# Patient Record
Sex: MALE | Race: WHITE | Employment: UNEMPLOYED | URBAN - METROPOLITAN AREA
[De-identification: names, ages, dates, MRNs, and addresses within clinical notes are randomized per-mention and may not be internally consistent; named-entity substitution may affect disease eponyms.]

---

## 2021-06-05 ENCOUNTER — APPOINTMENT (OUTPATIENT)
Dept: GENERAL RADIOLOGY | Age: 51
End: 2021-06-05
Payer: MEDICARE

## 2021-06-05 ENCOUNTER — HOSPITAL ENCOUNTER (OUTPATIENT)
Age: 51
Setting detail: OBSERVATION
Discharge: HOME | End: 2021-06-08
Attending: EMERGENCY MEDICINE | Admitting: ORTHOPAEDIC SURGERY
Payer: MEDICARE

## 2021-06-05 DIAGNOSIS — M89.8X6 PAIN IN LEFT TIBIA: ICD-10-CM

## 2021-06-05 DIAGNOSIS — S82.142A TIBIAL PLATEAU FRACTURE, LEFT, CLOSED, INITIAL ENCOUNTER: Primary | ICD-10-CM

## 2021-06-05 PROCEDURE — 96374 THER/PROPH/DIAG INJ IV PUSH: CPT

## 2021-06-05 PROCEDURE — 73560 X-RAY EXAM OF KNEE 1 OR 2: CPT

## 2021-06-05 PROCEDURE — 71045 X-RAY EXAM CHEST 1 VIEW: CPT

## 2021-06-05 PROCEDURE — 96375 TX/PRO/DX INJ NEW DRUG ADDON: CPT

## 2021-06-05 PROCEDURE — 73590 X-RAY EXAM OF LOWER LEG: CPT

## 2021-06-05 PROCEDURE — 6360000002 HC RX W HCPCS: Performed by: EMERGENCY MEDICINE

## 2021-06-05 RX ORDER — FENTANYL CITRATE 50 UG/ML
50 INJECTION, SOLUTION INTRAMUSCULAR; INTRAVENOUS ONCE
Status: COMPLETED | OUTPATIENT
Start: 2021-06-05 | End: 2021-06-05

## 2021-06-05 RX ADMIN — FENTANYL CITRATE 50 MCG: 0.05 INJECTION, SOLUTION INTRAMUSCULAR; INTRAVENOUS at 23:48

## 2021-06-05 ASSESSMENT — PAIN DESCRIPTION - DESCRIPTORS: DESCRIPTORS: TIGHTNESS;THROBBING;PRESSURE

## 2021-06-05 ASSESSMENT — PAIN DESCRIPTION - ONSET: ONSET: ON-GOING

## 2021-06-05 ASSESSMENT — PAIN DESCRIPTION - PAIN TYPE: TYPE: ACUTE PAIN

## 2021-06-05 ASSESSMENT — PAIN DESCRIPTION - LOCATION: LOCATION: KNEE

## 2021-06-05 ASSESSMENT — PAIN DESCRIPTION - ORIENTATION: ORIENTATION: LEFT

## 2021-06-05 ASSESSMENT — PAIN SCALES - GENERAL
PAINLEVEL_OUTOF10: 10
PAINLEVEL_OUTOF10: 10

## 2021-06-05 ASSESSMENT — PAIN DESCRIPTION - FREQUENCY: FREQUENCY: CONTINUOUS

## 2021-06-06 ENCOUNTER — APPOINTMENT (OUTPATIENT)
Dept: CT IMAGING | Age: 51
End: 2021-06-06
Payer: MEDICARE

## 2021-06-06 ENCOUNTER — ANESTHESIA EVENT (OUTPATIENT)
Dept: OPERATING ROOM | Age: 51
End: 2021-06-06
Payer: MEDICARE

## 2021-06-06 ENCOUNTER — ANESTHESIA (OUTPATIENT)
Dept: OPERATING ROOM | Age: 51
End: 2021-06-06
Payer: MEDICARE

## 2021-06-06 ENCOUNTER — APPOINTMENT (OUTPATIENT)
Dept: GENERAL RADIOLOGY | Age: 51
End: 2021-06-06
Payer: MEDICARE

## 2021-06-06 VITALS — DIASTOLIC BLOOD PRESSURE: 91 MMHG | TEMPERATURE: 96.8 F | OXYGEN SATURATION: 96 % | SYSTOLIC BLOOD PRESSURE: 127 MMHG

## 2021-06-06 PROBLEM — S82.142A TIBIAL PLATEAU FRACTURE, LEFT, CLOSED, INITIAL ENCOUNTER: Status: ACTIVE | Noted: 2021-06-06

## 2021-06-06 LAB
ABO/RH: NORMAL
ANION GAP SERPL CALCULATED.3IONS-SCNC: 10 MMOL/L (ref 7–16)
ANTIBODY SCREEN: NORMAL
BUN BLDV-MCNC: 9 MG/DL (ref 6–20)
CALCIUM SERPL-MCNC: 8 MG/DL (ref 8.6–10.2)
CHLORIDE BLD-SCNC: 98 MMOL/L (ref 98–107)
CO2: 24 MMOL/L (ref 22–29)
CREAT SERPL-MCNC: 0.8 MG/DL (ref 0.7–1.2)
GFR AFRICAN AMERICAN: >60
GFR NON-AFRICAN AMERICAN: >60 ML/MIN/1.73
GLUCOSE BLD-MCNC: 130 MG/DL (ref 74–99)
HCT VFR BLD CALC: 39.7 % (ref 37–54)
HEMOGLOBIN: 13.8 G/DL (ref 12.5–16.5)
INR BLD: 1.1
MCH RBC QN AUTO: 30.9 PG (ref 26–35)
MCHC RBC AUTO-ENTMCNC: 34.8 % (ref 32–34.5)
MCV RBC AUTO: 89 FL (ref 80–99.9)
PDW BLD-RTO: 12.3 FL (ref 11.5–15)
PLATELET # BLD: 249 E9/L (ref 130–450)
PMV BLD AUTO: 9.7 FL (ref 7–12)
POTASSIUM SERPL-SCNC: 3.8 MMOL/L (ref 3.5–5)
PROTHROMBIN TIME: 12.4 SEC (ref 9.3–12.4)
RBC # BLD: 4.46 E12/L (ref 3.8–5.8)
SODIUM BLD-SCNC: 132 MMOL/L (ref 132–146)
WBC # BLD: 11.9 E9/L (ref 4.5–11.5)

## 2021-06-06 PROCEDURE — 2720000010 HC SURG SUPPLY STERILE: Performed by: ORTHOPAEDIC SURGERY

## 2021-06-06 PROCEDURE — 2580000003 HC RX 258

## 2021-06-06 PROCEDURE — 2580000003 HC RX 258: Performed by: STUDENT IN AN ORGANIZED HEALTH CARE EDUCATION/TRAINING PROGRAM

## 2021-06-06 PROCEDURE — 85610 PROTHROMBIN TIME: CPT

## 2021-06-06 PROCEDURE — 72125 CT NECK SPINE W/O DYE: CPT

## 2021-06-06 PROCEDURE — 6360000002 HC RX W HCPCS: Performed by: STUDENT IN AN ORGANIZED HEALTH CARE EDUCATION/TRAINING PROGRAM

## 2021-06-06 PROCEDURE — 99285 EMERGENCY DEPT VISIT HI MDM: CPT

## 2021-06-06 PROCEDURE — 6370000000 HC RX 637 (ALT 250 FOR IP): Performed by: STUDENT IN AN ORGANIZED HEALTH CARE EDUCATION/TRAINING PROGRAM

## 2021-06-06 PROCEDURE — 70450 CT HEAD/BRAIN W/O DYE: CPT

## 2021-06-06 PROCEDURE — 86901 BLOOD TYPING SEROLOGIC RH(D): CPT

## 2021-06-06 PROCEDURE — C1713 ANCHOR/SCREW BN/BN,TIS/BN: HCPCS | Performed by: ORTHOPAEDIC SURGERY

## 2021-06-06 PROCEDURE — 3700000001 HC ADD 15 MINUTES (ANESTHESIA): Performed by: ORTHOPAEDIC SURGERY

## 2021-06-06 PROCEDURE — G0378 HOSPITAL OBSERVATION PER HR: HCPCS

## 2021-06-06 PROCEDURE — 3700000000 HC ANESTHESIA ATTENDED CARE: Performed by: ORTHOPAEDIC SURGERY

## 2021-06-06 PROCEDURE — 6370000000 HC RX 637 (ALT 250 FOR IP): Performed by: PHYSICAL THERAPY ASSISTANT

## 2021-06-06 PROCEDURE — 70486 CT MAXILLOFACIAL W/O DYE: CPT

## 2021-06-06 PROCEDURE — 2580000003 HC RX 258: Performed by: PHYSICAL THERAPY ASSISTANT

## 2021-06-06 PROCEDURE — 3600000003 HC SURGERY LEVEL 3 BASE: Performed by: ORTHOPAEDIC SURGERY

## 2021-06-06 PROCEDURE — 80048 BASIC METABOLIC PNL TOTAL CA: CPT

## 2021-06-06 PROCEDURE — 3600000013 HC SURGERY LEVEL 3 ADDTL 15MIN: Performed by: ORTHOPAEDIC SURGERY

## 2021-06-06 PROCEDURE — 6360000002 HC RX W HCPCS: Performed by: ANESTHESIOLOGY

## 2021-06-06 PROCEDURE — 86900 BLOOD TYPING SEROLOGIC ABO: CPT

## 2021-06-06 PROCEDURE — 7100000001 HC PACU RECOVERY - ADDTL 15 MIN: Performed by: ORTHOPAEDIC SURGERY

## 2021-06-06 PROCEDURE — 73700 CT LOWER EXTREMITY W/O DYE: CPT

## 2021-06-06 PROCEDURE — 2500000003 HC RX 250 WO HCPCS

## 2021-06-06 PROCEDURE — 7100000000 HC PACU RECOVERY - FIRST 15 MIN: Performed by: ORTHOPAEDIC SURGERY

## 2021-06-06 PROCEDURE — 2709999900 HC NON-CHARGEABLE SUPPLY: Performed by: ORTHOPAEDIC SURGERY

## 2021-06-06 PROCEDURE — 85027 COMPLETE CBC AUTOMATED: CPT

## 2021-06-06 PROCEDURE — 6360000002 HC RX W HCPCS

## 2021-06-06 PROCEDURE — 6360000002 HC RX W HCPCS: Performed by: PHYSICAL THERAPY ASSISTANT

## 2021-06-06 PROCEDURE — 96375 TX/PRO/DX INJ NEW DRUG ADDON: CPT

## 2021-06-06 PROCEDURE — 3209999900 FLUORO FOR SURGICAL PROCEDURES

## 2021-06-06 PROCEDURE — 86850 RBC ANTIBODY SCREEN: CPT

## 2021-06-06 RX ORDER — SODIUM CHLORIDE 0.9 % (FLUSH) 0.9 %
5-40 SYRINGE (ML) INJECTION EVERY 12 HOURS SCHEDULED
Status: DISCONTINUED | OUTPATIENT
Start: 2021-06-06 | End: 2021-06-08 | Stop reason: HOSPADM

## 2021-06-06 RX ORDER — GLYCOPYRROLATE 1 MG/5 ML
SYRINGE (ML) INTRAVENOUS PRN
Status: DISCONTINUED | OUTPATIENT
Start: 2021-06-06 | End: 2021-06-06 | Stop reason: SDUPTHER

## 2021-06-06 RX ORDER — SODIUM CHLORIDE 0.9 % (FLUSH) 0.9 %
10 SYRINGE (ML) INJECTION EVERY 12 HOURS SCHEDULED
Status: DISCONTINUED | OUTPATIENT
Start: 2021-06-06 | End: 2021-06-08 | Stop reason: HOSPADM

## 2021-06-06 RX ORDER — LABETALOL HYDROCHLORIDE 5 MG/ML
INJECTION, SOLUTION INTRAVENOUS
Status: COMPLETED
Start: 2021-06-06 | End: 2021-06-06

## 2021-06-06 RX ORDER — MORPHINE SULFATE 2 MG/ML
2 INJECTION, SOLUTION INTRAMUSCULAR; INTRAVENOUS
Status: DISCONTINUED | OUTPATIENT
Start: 2021-06-06 | End: 2021-06-08 | Stop reason: HOSPADM

## 2021-06-06 RX ORDER — METHOCARBAMOL 500 MG/1
1500 TABLET, FILM COATED ORAL 4 TIMES DAILY
Status: DISCONTINUED | OUTPATIENT
Start: 2021-06-06 | End: 2021-06-08 | Stop reason: HOSPADM

## 2021-06-06 RX ORDER — SODIUM CHLORIDE 0.9 % (FLUSH) 0.9 %
10 SYRINGE (ML) INJECTION PRN
Status: DISCONTINUED | OUTPATIENT
Start: 2021-06-06 | End: 2021-06-08 | Stop reason: HOSPADM

## 2021-06-06 RX ORDER — SODIUM CHLORIDE 0.9 % (FLUSH) 0.9 %
5-40 SYRINGE (ML) INJECTION PRN
Status: DISCONTINUED | OUTPATIENT
Start: 2021-06-06 | End: 2021-06-08 | Stop reason: HOSPADM

## 2021-06-06 RX ORDER — OXYCODONE HYDROCHLORIDE 10 MG/1
10 TABLET ORAL EVERY 4 HOURS PRN
Status: DISCONTINUED | OUTPATIENT
Start: 2021-06-06 | End: 2021-06-08 | Stop reason: HOSPADM

## 2021-06-06 RX ORDER — SODIUM CHLORIDE 9 MG/ML
25 INJECTION, SOLUTION INTRAVENOUS PRN
Status: DISCONTINUED | OUTPATIENT
Start: 2021-06-06 | End: 2021-06-08 | Stop reason: HOSPADM

## 2021-06-06 RX ORDER — ROCURONIUM BROMIDE 10 MG/ML
INJECTION, SOLUTION INTRAVENOUS PRN
Status: DISCONTINUED | OUTPATIENT
Start: 2021-06-06 | End: 2021-06-06 | Stop reason: SDUPTHER

## 2021-06-06 RX ORDER — MORPHINE SULFATE 4 MG/ML
4 INJECTION, SOLUTION INTRAMUSCULAR; INTRAVENOUS
Status: DISCONTINUED | OUTPATIENT
Start: 2021-06-06 | End: 2021-06-08 | Stop reason: HOSPADM

## 2021-06-06 RX ORDER — PROMETHAZINE HYDROCHLORIDE 25 MG/ML
6.25 INJECTION, SOLUTION INTRAMUSCULAR; INTRAVENOUS
Status: DISCONTINUED | OUTPATIENT
Start: 2021-06-06 | End: 2021-06-06

## 2021-06-06 RX ORDER — ONDANSETRON 4 MG/1
4 TABLET, ORALLY DISINTEGRATING ORAL EVERY 8 HOURS PRN
Status: DISCONTINUED | OUTPATIENT
Start: 2021-06-06 | End: 2021-06-08 | Stop reason: HOSPADM

## 2021-06-06 RX ORDER — SENNA PLUS 8.6 MG/1
1 TABLET ORAL DAILY PRN
Status: DISCONTINUED | OUTPATIENT
Start: 2021-06-06 | End: 2021-06-08 | Stop reason: HOSPADM

## 2021-06-06 RX ORDER — OXYCODONE HYDROCHLORIDE AND ACETAMINOPHEN 5; 325 MG/1; MG/1
1 TABLET ORAL
Status: DISCONTINUED | OUTPATIENT
Start: 2021-06-06 | End: 2021-06-06

## 2021-06-06 RX ORDER — LIDOCAINE HYDROCHLORIDE 20 MG/ML
INJECTION, SOLUTION INTRAVENOUS PRN
Status: DISCONTINUED | OUTPATIENT
Start: 2021-06-06 | End: 2021-06-06 | Stop reason: SDUPTHER

## 2021-06-06 RX ORDER — POLYETHYLENE GLYCOL 3350 17 G/17G
17 POWDER, FOR SOLUTION ORAL DAILY PRN
Status: DISCONTINUED | OUTPATIENT
Start: 2021-06-06 | End: 2021-06-08 | Stop reason: HOSPADM

## 2021-06-06 RX ORDER — OXYCODONE HYDROCHLORIDE 5 MG/1
5 TABLET ORAL EVERY 4 HOURS PRN
Status: DISCONTINUED | OUTPATIENT
Start: 2021-06-06 | End: 2021-06-08 | Stop reason: HOSPADM

## 2021-06-06 RX ORDER — FENTANYL CITRATE 50 UG/ML
25 INJECTION, SOLUTION INTRAMUSCULAR; INTRAVENOUS EVERY 5 MIN PRN
Status: DISCONTINUED | OUTPATIENT
Start: 2021-06-06 | End: 2021-06-06

## 2021-06-06 RX ORDER — OXYCODONE HYDROCHLORIDE 5 MG/1
5 TABLET ORAL ONCE
Status: COMPLETED | OUTPATIENT
Start: 2021-06-06 | End: 2021-06-06

## 2021-06-06 RX ORDER — FENTANYL CITRATE 50 UG/ML
INJECTION, SOLUTION INTRAMUSCULAR; INTRAVENOUS PRN
Status: DISCONTINUED | OUTPATIENT
Start: 2021-06-06 | End: 2021-06-06 | Stop reason: SDUPTHER

## 2021-06-06 RX ORDER — MEPERIDINE HYDROCHLORIDE 25 MG/ML
12.5 INJECTION INTRAMUSCULAR; INTRAVENOUS; SUBCUTANEOUS EVERY 5 MIN PRN
Status: DISCONTINUED | OUTPATIENT
Start: 2021-06-06 | End: 2021-06-06

## 2021-06-06 RX ORDER — DIPHENHYDRAMINE HYDROCHLORIDE 50 MG/ML
12.5 INJECTION INTRAMUSCULAR; INTRAVENOUS
Status: DISCONTINUED | OUTPATIENT
Start: 2021-06-06 | End: 2021-06-06

## 2021-06-06 RX ORDER — ACETAMINOPHEN 325 MG/1
650 TABLET ORAL EVERY 6 HOURS SCHEDULED
Status: DISCONTINUED | OUTPATIENT
Start: 2021-06-06 | End: 2021-06-06 | Stop reason: SDUPTHER

## 2021-06-06 RX ORDER — OXYCODONE HYDROCHLORIDE AND ACETAMINOPHEN 5; 325 MG/1; MG/1
1 TABLET ORAL EVERY 6 HOURS PRN
Qty: 28 TABLET | Refills: 0 | Status: SHIPPED | OUTPATIENT
Start: 2021-06-06 | End: 2021-06-13

## 2021-06-06 RX ORDER — SODIUM CHLORIDE 9 MG/ML
INJECTION, SOLUTION INTRAVENOUS CONTINUOUS
Status: DISCONTINUED | OUTPATIENT
Start: 2021-06-06 | End: 2021-06-07

## 2021-06-06 RX ORDER — MIDAZOLAM HYDROCHLORIDE 1 MG/ML
INJECTION INTRAMUSCULAR; INTRAVENOUS PRN
Status: DISCONTINUED | OUTPATIENT
Start: 2021-06-06 | End: 2021-06-06 | Stop reason: SDUPTHER

## 2021-06-06 RX ORDER — NEOSTIGMINE METHYLSULFATE 1 MG/ML
INJECTION, SOLUTION INTRAVENOUS PRN
Status: DISCONTINUED | OUTPATIENT
Start: 2021-06-06 | End: 2021-06-06 | Stop reason: SDUPTHER

## 2021-06-06 RX ORDER — SODIUM CHLORIDE 9 MG/ML
INJECTION, SOLUTION INTRAVENOUS CONTINUOUS PRN
Status: DISCONTINUED | OUTPATIENT
Start: 2021-06-06 | End: 2021-06-06 | Stop reason: SDUPTHER

## 2021-06-06 RX ORDER — CEFAZOLIN SODIUM 1 G/3ML
INJECTION, POWDER, FOR SOLUTION INTRAMUSCULAR; INTRAVENOUS PRN
Status: DISCONTINUED | OUTPATIENT
Start: 2021-06-06 | End: 2021-06-06 | Stop reason: SDUPTHER

## 2021-06-06 RX ORDER — ONDANSETRON 2 MG/ML
4 INJECTION INTRAMUSCULAR; INTRAVENOUS EVERY 6 HOURS PRN
Status: DISCONTINUED | OUTPATIENT
Start: 2021-06-06 | End: 2021-06-06 | Stop reason: SDUPTHER

## 2021-06-06 RX ORDER — ONDANSETRON 2 MG/ML
4 INJECTION INTRAMUSCULAR; INTRAVENOUS EVERY 6 HOURS PRN
Status: DISCONTINUED | OUTPATIENT
Start: 2021-06-06 | End: 2021-06-08 | Stop reason: HOSPADM

## 2021-06-06 RX ORDER — PROPOFOL 10 MG/ML
INJECTION, EMULSION INTRAVENOUS PRN
Status: DISCONTINUED | OUTPATIENT
Start: 2021-06-06 | End: 2021-06-06 | Stop reason: SDUPTHER

## 2021-06-06 RX ORDER — PROMETHAZINE HYDROCHLORIDE 25 MG/1
12.5 TABLET ORAL EVERY 6 HOURS PRN
Status: DISCONTINUED | OUTPATIENT
Start: 2021-06-06 | End: 2021-06-06 | Stop reason: SDUPTHER

## 2021-06-06 RX ORDER — LABETALOL HYDROCHLORIDE 5 MG/ML
5 INJECTION, SOLUTION INTRAVENOUS EVERY 10 MIN PRN
Status: DISCONTINUED | OUTPATIENT
Start: 2021-06-06 | End: 2021-06-06 | Stop reason: HOSPADM

## 2021-06-06 RX ORDER — ASPIRIN 325 MG
325 TABLET, DELAYED RELEASE (ENTERIC COATED) ORAL 2 TIMES DAILY
Qty: 56 TABLET | Refills: 0 | Status: SHIPPED | OUTPATIENT
Start: 2021-06-06 | End: 2021-06-18

## 2021-06-06 RX ORDER — DEXAMETHASONE SODIUM PHOSPHATE 10 MG/ML
INJECTION INTRAMUSCULAR; INTRAVENOUS PRN
Status: DISCONTINUED | OUTPATIENT
Start: 2021-06-06 | End: 2021-06-06 | Stop reason: SDUPTHER

## 2021-06-06 RX ORDER — ONDANSETRON 2 MG/ML
INJECTION INTRAMUSCULAR; INTRAVENOUS PRN
Status: DISCONTINUED | OUTPATIENT
Start: 2021-06-06 | End: 2021-06-06 | Stop reason: SDUPTHER

## 2021-06-06 RX ORDER — ACETAMINOPHEN 325 MG/1
650 TABLET ORAL EVERY 6 HOURS
Status: DISCONTINUED | OUTPATIENT
Start: 2021-06-06 | End: 2021-06-08 | Stop reason: HOSPADM

## 2021-06-06 RX ORDER — FENTANYL CITRATE 50 UG/ML
50 INJECTION, SOLUTION INTRAMUSCULAR; INTRAVENOUS EVERY 5 MIN PRN
Status: DISCONTINUED | OUTPATIENT
Start: 2021-06-06 | End: 2021-06-06

## 2021-06-06 RX ADMIN — ROCURONIUM BROMIDE 50 MG: 10 INJECTION, SOLUTION INTRAVENOUS at 10:11

## 2021-06-06 RX ADMIN — MORPHINE SULFATE 2 MG: 2 INJECTION, SOLUTION INTRAMUSCULAR; INTRAVENOUS at 23:55

## 2021-06-06 RX ADMIN — FENTANYL CITRATE 50 MCG: 50 INJECTION, SOLUTION INTRAMUSCULAR; INTRAVENOUS at 10:58

## 2021-06-06 RX ADMIN — SODIUM CHLORIDE, PRESERVATIVE FREE 10 ML: 5 INJECTION INTRAVENOUS at 20:16

## 2021-06-06 RX ADMIN — HYDROMORPHONE HYDROCHLORIDE 0.5 MG: 1 INJECTION, SOLUTION INTRAMUSCULAR; INTRAVENOUS; SUBCUTANEOUS at 11:43

## 2021-06-06 RX ADMIN — CEFAZOLIN 3000 MG: 10 INJECTION, POWDER, FOR SOLUTION INTRAVENOUS at 18:47

## 2021-06-06 RX ADMIN — ONDANSETRON HYDROCHLORIDE 4 MG: 2 INJECTION, SOLUTION INTRAMUSCULAR; INTRAVENOUS at 10:35

## 2021-06-06 RX ADMIN — SODIUM CHLORIDE: 9 INJECTION, SOLUTION INTRAVENOUS at 10:05

## 2021-06-06 RX ADMIN — OXYCODONE 5 MG: 5 TABLET ORAL at 03:40

## 2021-06-06 RX ADMIN — METHOCARBAMOL TABLETS 1500 MG: 500 TABLET, COATED ORAL at 01:00

## 2021-06-06 RX ADMIN — Medication 3 MG: at 10:50

## 2021-06-06 RX ADMIN — ACETAMINOPHEN 650 MG: 325 TABLET, FILM COATED ORAL at 13:19

## 2021-06-06 RX ADMIN — Medication 0.6 MG: at 10:50

## 2021-06-06 RX ADMIN — FENTANYL CITRATE 50 MCG: 50 INJECTION, SOLUTION INTRAMUSCULAR; INTRAVENOUS at 10:49

## 2021-06-06 RX ADMIN — MORPHINE SULFATE 2 MG: 2 INJECTION, SOLUTION INTRAMUSCULAR; INTRAVENOUS at 18:11

## 2021-06-06 RX ADMIN — ACETAMINOPHEN 650 MG: 325 TABLET ORAL at 20:16

## 2021-06-06 RX ADMIN — ACETAMINOPHEN 650 MG: 325 TABLET ORAL at 13:19

## 2021-06-06 RX ADMIN — MORPHINE SULFATE 4 MG: 4 INJECTION, SOLUTION INTRAMUSCULAR; INTRAVENOUS at 01:00

## 2021-06-06 RX ADMIN — LABETALOL HYDROCHLORIDE 5 MG: 5 INJECTION INTRAVENOUS at 11:21

## 2021-06-06 RX ADMIN — OXYCODONE HYDROCHLORIDE 5 MG: 5 TABLET ORAL at 07:04

## 2021-06-06 RX ADMIN — HYDROMORPHONE HYDROCHLORIDE 0.5 MG: 1 INJECTION, SOLUTION INTRAMUSCULAR; INTRAVENOUS; SUBCUTANEOUS at 11:48

## 2021-06-06 RX ADMIN — SODIUM CHLORIDE, PRESERVATIVE FREE 10 ML: 5 INJECTION INTRAVENOUS at 23:55

## 2021-06-06 RX ADMIN — FENTANYL CITRATE 150 MCG: 50 INJECTION, SOLUTION INTRAMUSCULAR; INTRAVENOUS at 10:10

## 2021-06-06 RX ADMIN — PROPOFOL 200 MG: 10 INJECTION, EMULSION INTRAVENOUS at 10:10

## 2021-06-06 RX ADMIN — OXYCODONE HYDROCHLORIDE 10 MG: 10 TABLET ORAL at 22:31

## 2021-06-06 RX ADMIN — DEXAMETHASONE SODIUM PHOSPHATE 10 MG: 10 INJECTION INTRAMUSCULAR; INTRAVENOUS at 10:10

## 2021-06-06 RX ADMIN — MIDAZOLAM 2 MG: 1 INJECTION INTRAMUSCULAR; INTRAVENOUS at 10:05

## 2021-06-06 RX ADMIN — CEFAZOLIN 3000 MG: 1 INJECTION, POWDER, FOR SOLUTION INTRAMUSCULAR; INTRAVENOUS at 10:20

## 2021-06-06 RX ADMIN — METHOCARBAMOL TABLETS 1500 MG: 500 TABLET, COATED ORAL at 16:27

## 2021-06-06 RX ADMIN — MORPHINE SULFATE 4 MG: 4 INJECTION, SOLUTION INTRAMUSCULAR; INTRAVENOUS at 09:12

## 2021-06-06 RX ADMIN — MORPHINE SULFATE 2 MG: 2 INJECTION, SOLUTION INTRAMUSCULAR; INTRAVENOUS at 20:22

## 2021-06-06 RX ADMIN — ASPIRIN 325 MG: 325 TABLET, COATED ORAL at 16:25

## 2021-06-06 RX ADMIN — OXYCODONE HYDROCHLORIDE 5 MG: 5 TABLET ORAL at 16:28

## 2021-06-06 RX ADMIN — LIDOCAINE HYDROCHLORIDE 100 MG: 20 INJECTION, SOLUTION INTRAVENOUS at 10:10

## 2021-06-06 RX ADMIN — BISACODYL 5 MG: 5 TABLET, COATED ORAL at 16:26

## 2021-06-06 RX ADMIN — SODIUM CHLORIDE 100 ML/HR: 9 INJECTION, SOLUTION INTRAVENOUS at 13:22

## 2021-06-06 RX ADMIN — METHOCARBAMOL TABLETS 1500 MG: 500 TABLET, COATED ORAL at 20:16

## 2021-06-06 ASSESSMENT — PULMONARY FUNCTION TESTS
PIF_VALUE: 21
PIF_VALUE: 21
PIF_VALUE: 22
PIF_VALUE: 5
PIF_VALUE: 3
PIF_VALUE: 2
PIF_VALUE: 0
PIF_VALUE: 17
PIF_VALUE: 11
PIF_VALUE: 17
PIF_VALUE: 21
PIF_VALUE: 3
PIF_VALUE: 22
PIF_VALUE: 20
PIF_VALUE: 2
PIF_VALUE: 14
PIF_VALUE: 11
PIF_VALUE: 5
PIF_VALUE: 1
PIF_VALUE: 17
PIF_VALUE: 3
PIF_VALUE: 23
PIF_VALUE: 22
PIF_VALUE: 21
PIF_VALUE: 0
PIF_VALUE: 21
PIF_VALUE: 22
PIF_VALUE: 17
PIF_VALUE: 20
PIF_VALUE: 3
PIF_VALUE: 2
PIF_VALUE: 22
PIF_VALUE: 23
PIF_VALUE: 15
PIF_VALUE: 21
PIF_VALUE: 26
PIF_VALUE: 3
PIF_VALUE: 21
PIF_VALUE: 26
PIF_VALUE: 15
PIF_VALUE: 17
PIF_VALUE: 12
PIF_VALUE: 21
PIF_VALUE: 22
PIF_VALUE: 3
PIF_VALUE: 11
PIF_VALUE: 10
PIF_VALUE: 17
PIF_VALUE: 22
PIF_VALUE: 21
PIF_VALUE: 1
PIF_VALUE: 23
PIF_VALUE: 26
PIF_VALUE: 21
PIF_VALUE: 23
PIF_VALUE: 3
PIF_VALUE: 34
PIF_VALUE: 4

## 2021-06-06 ASSESSMENT — PAIN DESCRIPTION - ORIENTATION
ORIENTATION: LEFT

## 2021-06-06 ASSESSMENT — PAIN DESCRIPTION - FREQUENCY
FREQUENCY: CONTINUOUS

## 2021-06-06 ASSESSMENT — PAIN DESCRIPTION - LOCATION
LOCATION: LEG

## 2021-06-06 ASSESSMENT — PAIN - FUNCTIONAL ASSESSMENT: PAIN_FUNCTIONAL_ASSESSMENT: PREVENTS OR INTERFERES SOME ACTIVE ACTIVITIES AND ADLS

## 2021-06-06 ASSESSMENT — PAIN DESCRIPTION - DESCRIPTORS
DESCRIPTORS: DISCOMFORT;DULL
DESCRIPTORS: ACHING;DISCOMFORT
DESCRIPTORS: ACHING;DISCOMFORT
DESCRIPTORS: ACHING;DISCOMFORT;SORE

## 2021-06-06 ASSESSMENT — PAIN DESCRIPTION - ONSET
ONSET: ON-GOING

## 2021-06-06 ASSESSMENT — PAIN SCALES - GENERAL
PAINLEVEL_OUTOF10: 7
PAINLEVEL_OUTOF10: 6
PAINLEVEL_OUTOF10: 5
PAINLEVEL_OUTOF10: 6
PAINLEVEL_OUTOF10: 2
PAINLEVEL_OUTOF10: 2
PAINLEVEL_OUTOF10: 7
PAINLEVEL_OUTOF10: 10
PAINLEVEL_OUTOF10: 7
PAINLEVEL_OUTOF10: 8
PAINLEVEL_OUTOF10: 3
PAINLEVEL_OUTOF10: 5
PAINLEVEL_OUTOF10: 6
PAINLEVEL_OUTOF10: 7
PAINLEVEL_OUTOF10: 4

## 2021-06-06 ASSESSMENT — PAIN DESCRIPTION - PAIN TYPE
TYPE: SURGICAL PAIN

## 2021-06-06 ASSESSMENT — PAIN DESCRIPTION - PROGRESSION: CLINICAL_PROGRESSION: GRADUALLY IMPROVING
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
## 2021-06-06 NOTE — CONSULTS
negative for chest pain, palpitations  GASTROINTESTINAL:  negative for nausea, vomiting  GENITOURINARY:  negative for frequency  HEMATOLOGIC/LYMPHATIC:  negative for bleeding and petechiae  MUSCULOSKELETAL:  positive for left lower extremity pain and swelling  NEUROLOGICAL: Positive for head trauma, negative for LOC  BEHAVIOR/PSYCH:  negative for increased agitation and anxiety    PHYSICAL EXAM:    VITALS:  BP (!) 168/100   Pulse 103   Temp 98.2 °F (36.8 °C)   Resp 20   Wt 280 lb (127 kg)   SpO2 100%   CONSTITUTIONAL:  awake, alert, cooperative, no apparent distress, and appears stated age  MUSCULOSKELETAL:  Left lower Extremity:  Moderate to severe edema present about the proximal tibia  Ecchymosis present about the proximal tibia  Patient able to tolerate PROM of the foot and toes  Compartments moderately firm but compressible  +PF/DF/EHL  +2/4 DP & PT pulses, Brisk Cap refill, Toes warm and perfused  Distal sensation grossly intact to Peroneals, Sural, Saphenous, and tibial nrs    Secondary Exam:   · Bilateral UE: No obvious signs of trauma. -TTP to fingers, hand, wrist, forearm, elbow, humerus, shoulder or clavicle. -- Patient able to flex/extend fingers, wrist, elbow and shoulder with active and passive ROM without pain, compartments soft and compressible. · Right LE: No obvious signs of trauma. -TTP to foot, ankle, leg, knee, thigh, hip.-- Patient able to flex/extend toes, ankle, knee and hip with active and passive ROM without pain,+2/4 DP & PT pulses, cap refill <3sec, +5/5 PF/DF/EHL, distal sensation grossly intact to L4-S1 dermatomes, compartments soft and compressible. · Pelvis: -TTP, -Log roll, -Heel strike     DATA:    CBC: No results found for: WBC, RBC, HGB, HCT, MCV, MCH, MCHC, RDW, PLT, MPV  PT/INR:  No results found for: PROTIME, INR    Radiology Review:  X-ray left knee/tibia-fibula: Comminuted tibial plateau fracture with metaphyseal diaphyseal dissociation.   The fracture is shortened and flexed with accompanying varus angulation. There is also a minimally displaced fracture about the fibular neck. No other fractures or dislocations appreciated.     IMPRESSION:  · Comminuted Schatzker 6 tibial plateau fracture    PLAN:  · Orthopedics will plan for acute orthopedic surgical intervention, left lower extremity knee spanning external fixation device application with Dr. Js Moya 6/6/2021  · Nonweightbearing left lower extremity  · Treatment consent  · Ancef on-call to the OR  · Preoperative labs  · Left lower extremity neurovascular checks every 4 hours  · Multimodal pain control  · Hold anticoagulation preoperatively  · Rest, ice, elevate left lower extremity  · Plan to be discussed with attending    All questions were sought and answered during encounter    Electronically signed by Ezra Dominguez DO on 6/6/2021 at 12:13 AM
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
## 2021-06-06 NOTE — PROGRESS NOTES
ADDENDUM - PATIENT'S BELONGINGS BAG NOT SENT WITH PATIENT TO PACU, BELONGINGS BAG WAS PLACED UNDER PATIENT CART IN OR 9, AND WAS NOT FOUND UNDER CART WHEN TRANSPORTED ONTO IN PATIENT BED. OR STAFF SEARCHED ENTIRE AREA FOR BELONGINGS, INCLUDING TRASH AND LINEN BAGS. Drew Sanders RN.

## 2021-06-06 NOTE — BRIEF OP NOTE
Brief Postoperative Note      Patient: Skye Alanis  YOB: 1970  MRN: [id number]    Date of Procedure: 6/6/2021    Pre-Op Diagnosis: LEFT TIBIAL PLATEAU FX    Post-Op Diagnosis: Same       Procedure(s):  LEG EXTERNAL FIXATOR APPLICATION    Surgeon(s):  Heather Michael MD    Assistant:  Resident:  Roxie Rosen DO    Anesthesia: General    Estimated Blood Loss (mL): Minimal    Complications: None    Specimens:   * No specimens in log *    Implants:  * No implants in log *      Drains: * No LDAs found *    Findings: see op note    Electronically signed by Roxie Rosen DO on 6/6/2021 at 11:04 AM

## 2021-06-06 NOTE — ED NOTES
Pt undressed and placed in hospital gown, all belongings in one bag with optio label at bedside. Treatment consent signed and placed in patient paper chart.       Ivana Cote RN  06/06/21 [id number]

## 2021-06-06 NOTE — PROGRESS NOTES
Patient's belongings found in emergency and returned to patient. Patient states that all belongings are there.
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
## 2021-06-07 LAB
ANION GAP SERPL CALCULATED.3IONS-SCNC: 9 MMOL/L (ref 7–16)
BUN BLDV-MCNC: 12 MG/DL (ref 6–20)
CALCIUM SERPL-MCNC: 8.5 MG/DL (ref 8.6–10.2)
CHLORIDE BLD-SCNC: 103 MMOL/L (ref 98–107)
CO2: 24 MMOL/L (ref 22–29)
CREAT SERPL-MCNC: 0.9 MG/DL (ref 0.7–1.2)
GFR AFRICAN AMERICAN: >60
GFR NON-AFRICAN AMERICAN: >60 ML/MIN/1.73
GLUCOSE BLD-MCNC: 151 MG/DL (ref 74–99)
HCT VFR BLD CALC: 40 % (ref 37–54)
HEMOGLOBIN: 13.2 G/DL (ref 12.5–16.5)
MCH RBC QN AUTO: 30.1 PG (ref 26–35)
MCHC RBC AUTO-ENTMCNC: 33 % (ref 32–34.5)
MCV RBC AUTO: 91.1 FL (ref 80–99.9)
PDW BLD-RTO: 12.2 FL (ref 11.5–15)
PLATELET # BLD: 275 E9/L (ref 130–450)
PMV BLD AUTO: 9.8 FL (ref 7–12)
POTASSIUM SERPL-SCNC: 4.2 MMOL/L (ref 3.5–5)
RBC # BLD: 4.39 E12/L (ref 3.8–5.8)
SODIUM BLD-SCNC: 136 MMOL/L (ref 132–146)
WBC # BLD: 17.3 E9/L (ref 4.5–11.5)

## 2021-06-07 PROCEDURE — 97530 THERAPEUTIC ACTIVITIES: CPT

## 2021-06-07 PROCEDURE — 6370000000 HC RX 637 (ALT 250 FOR IP): Performed by: STUDENT IN AN ORGANIZED HEALTH CARE EDUCATION/TRAINING PROGRAM

## 2021-06-07 PROCEDURE — 2580000003 HC RX 258: Performed by: STUDENT IN AN ORGANIZED HEALTH CARE EDUCATION/TRAINING PROGRAM

## 2021-06-07 PROCEDURE — 96376 TX/PRO/DX INJ SAME DRUG ADON: CPT

## 2021-06-07 PROCEDURE — 96365 THER/PROPH/DIAG IV INF INIT: CPT

## 2021-06-07 PROCEDURE — 97165 OT EVAL LOW COMPLEX 30 MIN: CPT

## 2021-06-07 PROCEDURE — G0378 HOSPITAL OBSERVATION PER HR: HCPCS

## 2021-06-07 PROCEDURE — 6360000002 HC RX W HCPCS: Performed by: STUDENT IN AN ORGANIZED HEALTH CARE EDUCATION/TRAINING PROGRAM

## 2021-06-07 PROCEDURE — 36415 COLL VENOUS BLD VENIPUNCTURE: CPT

## 2021-06-07 PROCEDURE — 97161 PT EVAL LOW COMPLEX 20 MIN: CPT

## 2021-06-07 PROCEDURE — 80048 BASIC METABOLIC PNL TOTAL CA: CPT

## 2021-06-07 PROCEDURE — 6370000000 HC RX 637 (ALT 250 FOR IP): Performed by: PHYSICAL THERAPY ASSISTANT

## 2021-06-07 PROCEDURE — 96375 TX/PRO/DX INJ NEW DRUG ADDON: CPT

## 2021-06-07 PROCEDURE — 85027 COMPLETE CBC AUTOMATED: CPT

## 2021-06-07 RX ADMIN — METHOCARBAMOL TABLETS 1500 MG: 500 TABLET, COATED ORAL at 17:00

## 2021-06-07 RX ADMIN — METHOCARBAMOL TABLETS 1500 MG: 500 TABLET, COATED ORAL at 14:04

## 2021-06-07 RX ADMIN — OXYCODONE HYDROCHLORIDE 10 MG: 10 TABLET ORAL at 21:43

## 2021-06-07 RX ADMIN — ASPIRIN 325 MG: 325 TABLET, COATED ORAL at 20:47

## 2021-06-07 RX ADMIN — BISACODYL 5 MG: 5 TABLET, COATED ORAL at 08:17

## 2021-06-07 RX ADMIN — MORPHINE SULFATE 2 MG: 2 INJECTION, SOLUTION INTRAMUSCULAR; INTRAVENOUS at 06:06

## 2021-06-07 RX ADMIN — CEFAZOLIN 3000 MG: 10 INJECTION, POWDER, FOR SOLUTION INTRAVENOUS at 02:33

## 2021-06-07 RX ADMIN — ACETAMINOPHEN 650 MG: 325 TABLET ORAL at 08:17

## 2021-06-07 RX ADMIN — ASPIRIN 325 MG: 325 TABLET, COATED ORAL at 08:17

## 2021-06-07 RX ADMIN — ACETAMINOPHEN 650 MG: 325 TABLET ORAL at 20:47

## 2021-06-07 RX ADMIN — SODIUM CHLORIDE, PRESERVATIVE FREE 10 ML: 5 INJECTION INTRAVENOUS at 20:47

## 2021-06-07 RX ADMIN — METHOCARBAMOL TABLETS 1500 MG: 500 TABLET, COATED ORAL at 08:17

## 2021-06-07 RX ADMIN — METHOCARBAMOL TABLETS 1500 MG: 500 TABLET, COATED ORAL at 20:47

## 2021-06-07 RX ADMIN — OXYCODONE HYDROCHLORIDE 10 MG: 10 TABLET ORAL at 17:49

## 2021-06-07 RX ADMIN — OXYCODONE HYDROCHLORIDE 5 MG: 5 TABLET ORAL at 14:07

## 2021-06-07 RX ADMIN — MORPHINE SULFATE 4 MG: 4 INJECTION, SOLUTION INTRAMUSCULAR; INTRAVENOUS at 09:27

## 2021-06-07 RX ADMIN — ACETAMINOPHEN 650 MG: 325 TABLET ORAL at 14:04

## 2021-06-07 RX ADMIN — OXYCODONE HYDROCHLORIDE 10 MG: 10 TABLET ORAL at 08:24

## 2021-06-07 RX ADMIN — CEFAZOLIN 3000 MG: 10 INJECTION, POWDER, FOR SOLUTION INTRAVENOUS at 11:21

## 2021-06-07 RX ADMIN — MORPHINE SULFATE 2 MG: 2 INJECTION, SOLUTION INTRAMUSCULAR; INTRAVENOUS at 02:32

## 2021-06-07 RX ADMIN — ACETAMINOPHEN 650 MG: 325 TABLET ORAL at 02:33

## 2021-06-07 RX ADMIN — SODIUM CHLORIDE: 9 INJECTION, SOLUTION INTRAVENOUS at 02:48

## 2021-06-07 RX ADMIN — OXYCODONE HYDROCHLORIDE 10 MG: 10 TABLET ORAL at 03:48

## 2021-06-07 ASSESSMENT — PAIN SCALES - GENERAL
PAINLEVEL_OUTOF10: 6
PAINLEVEL_OUTOF10: 7
PAINLEVEL_OUTOF10: 6
PAINLEVEL_OUTOF10: 5
PAINLEVEL_OUTOF10: 7
PAINLEVEL_OUTOF10: 3
PAINLEVEL_OUTOF10: 3
PAINLEVEL_OUTOF10: 7
PAINLEVEL_OUTOF10: 3

## 2021-06-07 ASSESSMENT — PAIN DESCRIPTION - LOCATION
LOCATION: LEG

## 2021-06-07 ASSESSMENT — PAIN DESCRIPTION - ONSET
ONSET: ON-GOING

## 2021-06-07 ASSESSMENT — PAIN DESCRIPTION - PAIN TYPE
TYPE: SURGICAL PAIN

## 2021-06-07 ASSESSMENT — PAIN DESCRIPTION - PROGRESSION
CLINICAL_PROGRESSION: NOT CHANGED
CLINICAL_PROGRESSION: GRADUALLY WORSENING
CLINICAL_PROGRESSION: NOT CHANGED
CLINICAL_PROGRESSION: NOT CHANGED
CLINICAL_PROGRESSION: GRADUALLY WORSENING

## 2021-06-07 ASSESSMENT — PAIN - FUNCTIONAL ASSESSMENT
PAIN_FUNCTIONAL_ASSESSMENT: ACTIVITIES ARE NOT PREVENTED
PAIN_FUNCTIONAL_ASSESSMENT: ACTIVITIES ARE NOT PREVENTED
PAIN_FUNCTIONAL_ASSESSMENT: PREVENTS OR INTERFERES SOME ACTIVE ACTIVITIES AND ADLS
PAIN_FUNCTIONAL_ASSESSMENT: PREVENTS OR INTERFERES SOME ACTIVE ACTIVITIES AND ADLS
PAIN_FUNCTIONAL_ASSESSMENT: ACTIVITIES ARE NOT PREVENTED

## 2021-06-07 ASSESSMENT — PAIN DESCRIPTION - DESCRIPTORS
DESCRIPTORS: BURNING;CONSTANT;THROBBING
DESCRIPTORS: ACHING;CONSTANT;DISCOMFORT
DESCRIPTORS: ACHING;CONSTANT;DISCOMFORT
DESCRIPTORS: BURNING;CONSTANT;THROBBING
DESCRIPTORS: ACHING;CONSTANT;DISCOMFORT

## 2021-06-07 ASSESSMENT — PAIN DESCRIPTION - FREQUENCY
FREQUENCY: CONTINUOUS

## 2021-06-07 ASSESSMENT — PAIN DESCRIPTION - ORIENTATION
ORIENTATION: LEFT
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
## 2021-06-07 NOTE — CARE COORDINATION
Met with the pt at the bedside to discuss transition of care. The pt lives alone, but has friends who can help him. He has a friend who has DME that he can borrow. He will find out what he has. The pt is agreeable to [facility]) DME. He is pending medicaid.  Penelope Son -[phone]

## 2021-06-07 NOTE — PROGRESS NOTES
Department of Orthopedic Surgery  Resident Progress Note    Patient seen and examined. Pain controlled. No new complaints. Denies numbness, tingling, paresthesias. Denies chest pain, shortness of breath, dizziness/lightheadedness.     VITALS:  /79   Pulse 85   Temp 99.3 °F (37.4 °C) (Temporal)   Resp 18   Ht 6' 7\" (2.007 m)   Wt 280 lb (127 kg)   SpO2 93%   BMI 31.54 kg/m²     General: Alert and oriented    MUSCULOSKELETAL:   left lower extremity:  · Dressing C/D/I, exfix in place  · Compartments soft and compressible  · +PF/DF/EHL  · +2/4 DP & PT pulses, Brisk Cap refill, Toes warm and perfused  · Increased welling effusion about the knee with compared contra extremity  · Distal sensation grossly intact to Peroneals, Sural, Saphenous, and tibial nrs    CBC:   Lab Results   Component Value Date    WBC 11.9 06/06/2021    HGB 13.8 06/06/2021    HCT 39.7 06/06/2021     06/06/2021     PT/INR:    Lab Results   Component Value Date    PROTIME 12.4 06/06/2021    INR 1.1 06/06/2021       ASSESSMENT  · S/P external fixation left closed comminuted plateau fracture    PLAN    Nonweightbearing to the left lower extremity  24-hour postop antibiotics  PT/OT as able  Pain control  DVT prophylaxis aspirin 325 twice daily  D/C: Pain currently controlled, okay for discharge when antibiotics are complete and if patient mobilizes well with physical therapy, plan will be for follow-up with orthopedic trauma for definitive fixation after swelling subsides  Discussed with Dr. Aman Paredes

## 2021-06-07 NOTE — ANESTHESIA POSTPROCEDURE EVALUATION
Department of Anesthesiology  Postprocedure Note    Patient: Chiara River  MRN: [id number]  YOB: 1970  Date of evaluation: 6/6/2021  Time:  10:59 PM     Procedure Summary     Date: 06/06/21 Room / Location: [facility] 09 / [facility]    Anesthesia Start: 1005 Anesthesia Stop: 1112    Procedure: LEG EXTERNAL FIXATOR APPLICATION, LEFT (Left Leg Lower) Diagnosis: (LEFT TIBIAL PLATEAU [address] [place])    Surgeons: Sivan King MD Responsible Provider: Federico Abdul MD    Anesthesia Type: general ASA Status: 3          Anesthesia Type: general    Ayanna Phase I: Ayanna Score: 10    Ayanna Phase II:      Last vitals: Reviewed and per EMR flowsheets.        Anesthesia Post Evaluation    Patient location during evaluation: PACU  Patient participation: complete - patient participated  Level of consciousness: awake  Airway patency: patent  Nausea & Vomiting: no vomiting and no nausea  Complications: no  Cardiovascular status: hemodynamically stable  Respiratory status: acceptable  Hydration status: stable
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
## 2021-06-08 VITALS
WEIGHT: 280 LBS | HEART RATE: 89 BPM | SYSTOLIC BLOOD PRESSURE: 145 MMHG | RESPIRATION RATE: 16 BRPM | TEMPERATURE: 97.6 F | HEIGHT: 78 IN | OXYGEN SATURATION: 97 % | DIASTOLIC BLOOD PRESSURE: 96 MMHG | BODY MASS INDEX: 32.4 KG/M2

## 2021-06-08 PROCEDURE — 97530 THERAPEUTIC ACTIVITIES: CPT

## 2021-06-08 PROCEDURE — G0378 HOSPITAL OBSERVATION PER HR: HCPCS

## 2021-06-08 PROCEDURE — 6370000000 HC RX 637 (ALT 250 FOR IP): Performed by: PHYSICAL THERAPY ASSISTANT

## 2021-06-08 PROCEDURE — 6370000000 HC RX 637 (ALT 250 FOR IP): Performed by: STUDENT IN AN ORGANIZED HEALTH CARE EDUCATION/TRAINING PROGRAM

## 2021-06-08 PROCEDURE — 36415 COLL VENOUS BLD VENIPUNCTURE: CPT

## 2021-06-08 PROCEDURE — 2580000003 HC RX 258: Performed by: PHYSICAL THERAPY ASSISTANT

## 2021-06-08 RX ADMIN — METHOCARBAMOL TABLETS 1500 MG: 500 TABLET, COATED ORAL at 08:17

## 2021-06-08 RX ADMIN — ACETAMINOPHEN 650 MG: 325 TABLET ORAL at 08:18

## 2021-06-08 RX ADMIN — OXYCODONE HYDROCHLORIDE 10 MG: 10 TABLET ORAL at 06:40

## 2021-06-08 RX ADMIN — METHOCARBAMOL TABLETS 1500 MG: 500 TABLET, COATED ORAL at 12:57

## 2021-06-08 RX ADMIN — SODIUM CHLORIDE, PRESERVATIVE FREE 10 ML: 5 INJECTION INTRAVENOUS at 08:17

## 2021-06-08 RX ADMIN — ASPIRIN 325 MG: 325 TABLET, COATED ORAL at 08:17

## 2021-06-08 RX ADMIN — METHOCARBAMOL TABLETS 1500 MG: 500 TABLET, COATED ORAL at 16:37

## 2021-06-08 RX ADMIN — ACETAMINOPHEN 650 MG: 325 TABLET ORAL at 13:50

## 2021-06-08 RX ADMIN — BISACODYL 5 MG: 5 TABLET, COATED ORAL at 08:17

## 2021-06-08 ASSESSMENT — PAIN SCALES - GENERAL
PAINLEVEL_OUTOF10: 4
PAINLEVEL_OUTOF10: 5
PAINLEVEL_OUTOF10: 0
PAINLEVEL_OUTOF10: 5
PAINLEVEL_OUTOF10: 0
PAINLEVEL_OUTOF10: 5
PAINLEVEL_OUTOF10: 0
PAINLEVEL_OUTOF10: 4

## 2021-06-08 NOTE — PROGRESS NOTES
Physical Therapy  Treatment Note    Name: Jose Cade  : 1970  MRN: [id number]      Date of Service: 2021    Evaluating PT:  Fawn Cao, PT, DPT [id number]    Room #:  0416/2444-Q  Diagnosis:  Tibial plateau fracture, left, closed, initial encounter [S82.142A]  PMHx/PSHx:  None on file  Procedure/Surgery:  LEG EXTERNAL FIXATOR APPLICATION, LEFT 0/3/5846  Precautions:  Falls, NWB LLE  Equipment Needs:  Tall Bariatric WW    SUBJECTIVE:    Pt lives alone in a 3 story home with 3 stairs to enter and 2 rail +1 MONIKA 0 rail. Bed is on first floor and bath is on first floor. Pt ambulated with no AD independently PTA. Pt reports he has potential assistance from numerous friends and family. OBJECTIVE:   Initial Evaluation  Date: 2021 Treatment  Date: 2021 Short Term/ Long Term   Goals   AM-PAC 6 Clicks  35/    Was pt agreeable to Eval/treatment? yes yes    Does pt have pain? 3/10 LLE Minimal LLE pain    Bed Mobility  Rolling: NT  Supine to sit: Sakshi for LLE  Sit to supine: Sakshi for LLE  Scooting: Sakshi Rolling: NT  Supine to sit: NT  Sit to supine: SBA  Scooting: SBA Rolling: Independent  Supine to sit: Independent  Sit to supine: Independent  Scooting: Independent   Transfers Sit to stand: Sakshi  Stand to sit: Sakshi  Stand pivot: Sakshi tall bariatric WW with NWB LLE Sit<>stand: SBA from chair with arms, EOB, and wheelchair  Stand pivot: SBA front WW with NWB LLE Sit to stand: Independent  Stand to sit:  Independent  Stand pivot: Sarah tall bariatric WW NWB LLE   Ambulation    15 feet with tall bariatric WW Sakshi x 2 reps NWB LLE 40 feet front WW SBA and 25 feet front [employer] SBA with NWB LLE 50 feet with tall bariatric WW Sarah NWB LLE   Stair negotiation: ascended and descended  NT 2 steps with 2 rails ascending backwards with NWB LLE SBA 3 steps with 2 rail Sarah NWB LLE   ROM BUE:  Per OT note  BLE:  WNL     Strength BUE:  Per OT note  BLE:  WFL, LLE NT     Balance Sitting EOB:  SBA  Dynamic Standing:  Sakshi tall bariatric [employer] Sitting EOB: Independent   Dynamic standing: Sakshi front [employer] NWB LLE Sitting EOB:  Independent  Dynamic Standing:  Sarah tall bariatric WW     Pt is A & O x 4  Sensation:  Pt denies numbness and tingling to extremities  Edema:  unremarkable    Patient education  Pt educated on role of PT intervention. Pt educated on safety in room with utilization of call light for assistance with mobility. Pt educated on importance of maximizing OOB time by transferring to bedside chair for meals and ambulating to bathroom/transferring to bedside commode with assistance from nursing and therapy staff to increase functional activity tolerance and overall functional independence. Educated on safe utilization of front [employer] for ambulation/transfers with NWB LLE. Educated on safe stair negotiation to simulate home entry with 2 rails and NWB LLE. Patient response to education:   Pt verbalized understanding Pt demonstrated skill Pt requires further education in this area   yes yes yes     ASSESSMENT:    Comments:  RN cleared pt for activity prior to session. Pt received seated in chair and agreeable to PT intervention at this time. Pt performed all functional mobility as noted above. Pt reports pain is well controlled. Pt ambulated from room to wheelchair in hallway. Was then brought to practice stairs for stair training. Pt then able to ambulate back to room without need for wheelchair transfer. Continues to demonstrate adequate strength and coordination to complete functional mobility with WW and NWB LLE safely. Pt returned to bedside chair at end of session and left with all needs met and call light in reach. Pt requires continued skilled PT intervention for the purposes of maximizing functional mobility and independence by addressing deficits described above.     Treatment:  Patient practiced and was instructed in the following treatment:     Therapeutic Activities Completed:  o Functional mobility as noted above:   - Bed mobility: as noted above. Min VC for safe technique with swiveling LLE into bed. Pt able to reposition himself and scoot at SBA level. - Transfer training: SBA all aspects with front [employer]. Min VC for proper sequencing with front WW and NWB LLE during stand pivot transfers. Min VC for proper setup and use of BUE for safe sit<>stand transition.   - Ambulation: 40 feet and 25 feet front WW SBA with NWB LLE. Min VC for safe hopping technique with front [employer]. Pt demonstrates adequate capability to maintain NWB status for short household distances.    - Stair Trainin steps with 2 rails using BUE and ascending backwards. Mod VC for proper sequencing to maintain NWB of LLE while ascending stairs backwards and descending stairs forwards. Pt demonstrated good technique and adequate capability to execute safely.      o Skilled repositioning in supine with HOB elevated and LLE propped on pillow for comfort.  o Pt education as noted above. PLAN:    Patient is making fair progress towards established goals. Will continue with current POC.       Time in  1324  Time out  1340    Total Treatment Time  16 minutes     CPT codes:  [] Gait training 13476 0 minutes  [] Manual therapy 13941 0 minutes  [x] Therapeutic activities 44286 16 minutes  [] Therapeutic exercises 85091 0 minutes  [] Neuromuscular reeducation 56918 0 minutes    Claribel Ag, PT, DPT  [id number]

## 2021-06-08 NOTE — PROGRESS NOTES
Occupational Therapy  OT BEDSIDE TREATMENT NOTE   [facility] [address]  [address] [place] [address]      Date:2021  Patient Name: Dianne Perera  MRN: [id number]  : 1970  Room: [address] [place] [address]     Referring Provider: Kyleigh Collado DO  Specific Provider Orders/Date: OT evaluation and treat 21     Osvaldo Jade OT: Elliot Triana, OTR/L #[id number]     Diagnosis: tibeal plateau fracture s/p [facility]     Surgery: S/P external fixation left closed comminuted plateau fracture 29        Precautions:  Fall Risk, NWB to LLE     Assessment of current deficits   [x]? Functional mobility             [x]?ADLs           []? Strength                  []?Cognition   [x]? Functional transfers           [x]? IADLs         [x]? Safety Awareness   [x]? Endurance   []? Fine Coordination              []? Balance      []? Vision/perception   []? Sensation     []? Gross Motor Coordination  []? ROM           []?  Delirium                   []? Motor Control      OT PLAN OF CARE   OT POC based on physician orders, patient diagnosis and results of clinical assessment     Frequency/Duration   1-3 days/wk for 1 week PRN   Specific OT Treatment Interventions to include:   * Instruction/training on adapted ADL techniques and AE recommendations to increase functional independence within        precautions  * Training on energy conservation strategies, correct breathing pattern and techniques to improve independence/tolerance for self-care routine  * Functional transfer/mobility training/DME recommendations for increased independence, safety, and fall prevention  * Patient/Family education to increase follow through with safety techniques and functional independence  * Recommendation of environmental modifications for increased safety with functional transfers/mobility and ADLs        Recommended Adaptive Equipment: bariatric ww, transfer tub bench     Home Living: Pt lives with alone in a [facility] 1 1/2 story home with 3+1 steps and B  hand rails. Bed and bath on main floor floor. Bathroom setup: tub shower and elevated commode   Equipment owned: none     Prior Level of Function: Independent with ADLs , Independent with IADLs; ambulated no AD   Driving: yes  Occupation:   Medication management: self  Leisure: enjoys [facility] riding     Pain Level: Min LLE pain at rest increasing when not elevated  Cognition: A&O: 4/4; Follows multi step directions              Memory:  good              Sequencing:  Good-              Problem solving:  good              Judgement/safety:  fair                Functional Assessment:  AM-PAC Daily Activity Raw Score: 17/24    Initial Eval Status  Date: 6/7//21 Treatment Status  Date: 6/8/21 STGs = LTGs  Time frame: 1-3days   Feeding Independent  Ind     Grooming Setup sitting  CGA  To wash hands standing at sink Mod I    UB Dressing Setup  Ind  Mod I    LB Dressing Mod A able to don R sock  Mod A  To don socks seated upright in bed. Pt reports owning LB AE.  Mod I with AE prn   Bathing Simulated mod A Mod A  simulated  Mod I with TTB and LHS   Toileting Able to use urinal, mod A simulated needs elevated commode  Per last tx Mod I with ww to elevated commode   Bed Mobility  Supine to sit: min A   Sit to supine:  Min A   SBA- supine>sit Supine to sit: mod I   Sit to supine: mod I    Functional Transfers Min A with bariatric ww CGA- sit<->stand  Cuing for hand placement and body mechanics  Mod I    Functional Mobility Min A in room with [employer] CGA  To and from bathroom using w/w  Mod I    Balance Sitting:     Static:  good    Dynamic:SBA  Standing: min A  Sitting:     Static:  Ind    Dynamic: Ind  Standing: CGA                                                                        Activity Tolerance Good- O2 Raa 97% hr 137 Good-  Good with ADL completion   Visual/  Perceptual                        intact           Safety Good-  Fair Good follow through of NWB to LLE and safety with ADL completion       Comments: Upon arrival pt supine in bed. Pt educated on techniques to increase independence and safety during ADL's, bed mobility, and functional transfers. Discussed home set up with pt, giving suggestions to increase safety at discharge. At end of session pt left seated in bedside chair, LLE elevated, call light within reach. · Pt has made fair progress towards set goals.      · Continue with current plan of care    Treatment Time In: 10:45            Treatment Time Out: 11:00             Treatment Charges: Mins Units   Ther Ex  61547     Manual Therapy 45305     Thera Activities 42949 15 1   ADL/Home Mgt 13106     Neuro Re-ed 08331     Group Therapy      Orthotic manage/training  91845     Non-Billable Time     Total Timed Treatment 15 [address], [place] [address]

## 2021-06-08 NOTE — CARE COORDINATION
Discussed transition of care. The pt has a friend who has a tall ww, commode lift seat and shower seat, which he is going to borrow. He will be returning home when medically stable. A friend will provide transportation.  Fatou Rose -[phone]

## 2021-06-08 NOTE — PROGRESS NOTES
Department of Orthopedic Surgery  Resident Progress Note    POD 2. Patient seen and examined. Pain controlled. No new complaints. Denies chest pain, shortness of breath, dizziness/lightheadedness. VITALS:  BP (!) 145/96   Pulse 89   Temp 97.6 °F (36.4 °C) (Temporal)   Resp 16   Ht 6' 7\" (2.007 m)   Wt 280 lb (127 kg)   SpO2 97%   BMI 31.54 kg/m²     General: alert and oriented to person, place and time, well-developed and well-nourished, in no acute distress    MUSCULOSKELETAL:   left lower extremity:  · Dressing C/D/I, moderate saturation noted about the distal pin sites. Surgical dressing removed and new dressing applied  · Compartments soft and compressible  · +PF/DF/EHL  · +2/4 DP & PT pulses, Brisk Cap refill, Toes warm and perfused  · Distal sensation grossly intact to Peroneals, Sural, Saphenous, and tibial nrs  · External fixation pins well fixed    CBC:   Lab Results   Component Value Date    WBC 17.3 06/07/2021    HGB 13.2 06/07/2021    HCT 40.0 06/07/2021     06/07/2021     PT/INR:    Lab Results   Component Value Date    PROTIME 12.4 06/06/2021    INR 1.1 06/06/2021       ASSESSMENT  · S/P external fixation left closed comminuted plateau fracture 8/8/4373     PLAN    Nonweightbearing to the left lower extremity  PT/OT as able  Pain control  DVT prophylaxis aspirin 325 twice daily  Patient has DME needed for ADLs at home  D/C: Pain currently controlled, okay for discharge from orthopedic standpoint. Plan will be for follow-up with orthopedic trauma for definitive fixation after swelling subsides. This was discussed with the patient, he was in agreement and verbalized understanding.   Follow-up outpatient with Dr. Sarahi Beckford in 1 week    Electronically signed by Jose Rosales DO on 6/8/2021 at 4:47 PM

## 2021-06-08 NOTE — PROGRESS NOTES
Discharge paper work went over with patient. All questions answered. Patient is waiting for his ride to transport him home. Patient put in for transport.

## 2021-06-09 NOTE — DISCHARGE SUMMARY
Physician Discharge Summary     Patient ID:  Trent Hunter  [id number]  39 y.o.  1970    Admit date: 6/5/2021    Discharge date and time: 6/8/2021  6:29 PM     Admitting Physician: Marco Silveira MD     Discharge Physician: Marco Silveira MD    Admission Diagnoses: Tibial plateau fracture, left, closed, initial encounter [S82.142A]    Discharge Diagnoses: s/p left Schatzker 6 tibial plateau fracture, left leg external fixation application    Admission Condition: fair    Discharged Condition: stable    Hospital Course: The patient was admitted from the Emergency room. After examination, review of radiologic studies, and appropriate pre-operative risk assessment, it was recommended by Marco Silveira MD to undergo external fixation of the left lower extremity. The patient underwent an uneventful course of left lower extremity external fixation device application and closed reduction of the tibial plateau fracture by Marco Silveira MD on 6/6/2021. The patient was subsequently taken to the PACU and the floor in stable condition. The patient was continued on antibiotics for 24 hours post-operatively as they received a dose of antibiotics pre-operatively as well. The patient was started on DVT prophylaxis and physical therapy with instructions to be NWB. Blood counts were followed daily. On POD 1, after the patient had made appropriate gains in regaining independent function with physical therapy, the patient was discharged to home in stable condition. Treatments: s/p left lower extremity external fixation device application, left tibial plateau closed reduction    Disposition: The patient was provided instructions to continue DVT prophylaxis as instructed, pain medication as prescribed, and to continue daily dry sterile dressing changes until wound is dry. The pt was allowed to shower on POD 4.  The patient was to follow up with Dr. Miguel Whyte or Dr. William Quevedo in 1 week, and to call the office for an appointment.       Signed:  Shira Hollis DO  6/9/2021  10:39 AM

## 2021-06-11 DIAGNOSIS — S82.142A TIBIAL PLATEAU FRACTURE, LEFT, CLOSED, INITIAL ENCOUNTER: Primary | ICD-10-CM

## 2021-06-16 ENCOUNTER — HOSPITAL ENCOUNTER (OUTPATIENT)
Dept: GENERAL RADIOLOGY | Age: 51
Discharge: HOME | End: 2021-06-18
Payer: MEDICARE

## 2021-06-16 ENCOUNTER — OFFICE VISIT (OUTPATIENT)
Dept: ORTHOPEDIC SURGERY | Age: 51
End: 2021-06-16
Payer: MEDICARE

## 2021-06-16 ENCOUNTER — PREP FOR PROCEDURE (OUTPATIENT)
Dept: ORTHOPEDIC SURGERY | Age: 51
End: 2021-06-16

## 2021-06-16 VITALS — TEMPERATURE: 98.8 F

## 2021-06-16 DIAGNOSIS — S82.142A TIBIAL PLATEAU FRACTURE, LEFT, CLOSED, INITIAL ENCOUNTER: Primary | ICD-10-CM

## 2021-06-16 DIAGNOSIS — S82.142A TIBIAL PLATEAU FRACTURE, LEFT, CLOSED, INITIAL ENCOUNTER: ICD-10-CM

## 2021-06-16 DIAGNOSIS — L03.116 CELLULITIS OF LEFT LOWER EXTREMITY: Primary | ICD-10-CM

## 2021-06-16 PROCEDURE — 6370000000 HC RX 637 (ALT 250 FOR IP)

## 2021-06-16 PROCEDURE — 73560 X-RAY EXAM OF KNEE 1 OR 2: CPT

## 2021-06-16 PROCEDURE — 99203 OFFICE O/P NEW LOW 30 MIN: CPT | Performed by: PHYSICIAN ASSISTANT

## 2021-06-16 PROCEDURE — 99205 OFFICE O/P NEW HI 60 MIN: CPT | Performed by: PHYSICIAN ASSISTANT

## 2021-06-16 RX ORDER — SODIUM CHLORIDE 9 MG/ML
25 INJECTION, SOLUTION INTRAVENOUS PRN
Status: CANCELLED | OUTPATIENT
Start: 2021-06-16

## 2021-06-16 RX ORDER — SODIUM CHLORIDE 0.9 % (FLUSH) 0.9 %
10 SYRINGE (ML) INJECTION PRN
Status: CANCELLED | OUTPATIENT
Start: 2021-06-16

## 2021-06-16 RX ORDER — SODIUM CHLORIDE 0.9 % (FLUSH) 0.9 %
10 SYRINGE (ML) INJECTION EVERY 12 HOURS SCHEDULED
Status: CANCELLED | OUTPATIENT
Start: 2021-06-16

## 2021-06-16 RX ORDER — OXYCODONE HYDROCHLORIDE AND ACETAMINOPHEN 5; 325 MG/1; MG/1
1 TABLET ORAL EVERY 4 HOURS PRN
COMMUNITY
End: 2021-06-18

## 2021-06-16 RX ORDER — SULFAMETHOXAZOLE AND TRIMETHOPRIM 800; 160 MG/1; MG/1
1 TABLET ORAL 2 TIMES DAILY
Qty: 16 TABLET | Refills: 0 | Status: ON HOLD
Start: 2021-06-16 | End: 2021-06-24 | Stop reason: HOSPADM

## 2021-06-16 NOTE — PROGRESS NOTES
Orthopaedic H&P Note    Amparo Wang is a 53 y.o. male, his YOB: 1970 with the following history as recorded in [facility]:      Patient Active Problem List    Diagnosis Date Noted    Tibial plateau fracture, left, closed, initial encounter 06/06/2021     Current Outpatient Medications   Medication Sig Dispense Refill    oxyCODONE-acetaminophen (PERCOCET) 5-325 MG per tablet Take 1 tablet by mouth every 4 hours as needed for Pain.  aspirin 325 MG EC tablet Take 1 tablet by mouth 2 times daily for 28 days 56 tablet 0     No current facility-administered medications for this visit. Allergies: Patient has no known allergies. No past medical history on file. Past Surgical History:   Procedure Laterality Date    LEG SURGERY Left 6/6/2021    LEG EXTERNAL FIXATOR APPLICATION, LEFT performed by Will Redmond MD at [address] [place] [address]     No family history on file. Social History     Tobacco Use    Smoking status: Never Smoker    Smokeless tobacco: Never Used   Substance Use Topics    Alcohol use: Not on file                             Chief Complaint   Patient presents with    Follow-up     Left Tib plateau ex fix       SUBJECTIVE: Amparo Wang is here for initial evaluation in the orthopedic trauma office for left lower extremity tibial plateau fracture with external fixation by Dr. Va Rodrigues on 6/6/21. He has maintained nonweightbearing restrictions to the left lower extremity as instructed continues to take aspirin twice daily for DVT prophylaxis. States that he has no pain currently and no paresthesias and is overall doing well and denies fever, chills, malaise, calf pain, chest pain, shortness of breath. No history of nicotine use. BMI is 31.54. Here today for surgery set up for removal of external fixator with ORIF of left tibial plateau fracture. No significant past medical or past surgical history.     Review of Systems   Constitutional: Negative for fever, chills, diaphoresis, Musculoskeletal:    Extremity:  Left Lower Extremity  L LE external fixator intact without gross loosening  Large area of erythematous, slightly warm, blanching noted to distal anterior L leg overlying distal pin sites, characteristic of cellulitis. Distal pin sites with slough, no odor or expressible or active drainage. Nontender about this area to palpation. Still exhibits good AROM of the toes and ankle   Wrinkle test +  Compartments supple throughout thigh and leg  Calf supple and nontender  States sensation intact to touch in sural/deep peroneal/superficial peroneal/saphenous/posterior tibial nerve distributions to foot/ankle. Palpable dorsalis pedis and posterior tibialis pulses, cap refill brisk in toes, foot warm/perfused. Temp 98.8 °F (37.1 °C) (Oral)      XR: 6/16/21     3 views of L knee demonstrating external fixator appearing intact without loosening or failure. Comminuted, bicondylar tibial plateau fracture and proximal fibular fracture noted. No significant change in alignment. No other acute fractures or dislocations or any other osseus abnormality identified. ASSESSMENT:     Diagnosis Orders   1. Cellulitis of left lower extremity  sulfamethoxazole-trimethoprim (BACTRIM DS) 800-160 MG per tablet       Discussion:  Had lengthy discussion with patient regarding His diagnosis, typical prognosis, and expected outcomes. I reviewed the possible complications from the injury itself despite treatment choosen. I also discussed treatment options including nonoperative managements versus surgical management, along with risks and benefits of each. They have elected for operative management at this time. Discussed with patient factors that can impact patient's fracture healing. Patient has the following risk factors for union: none    Risk, benefits and treatment options discussed with Ana Tapia. he has verbalized understanding of options.  The possibility of complications were also discussed to include but not limited to nerve damage, infection, problems with wound healing, vascular injury, chronic pain, stiffness, dysfunction, nonhealing of the bone, symptomatic hardware and/or its failure, need for subsequent surgery, dislocation, and blood clots as well as medical related problems and other problems not specifically discussed. Risk of anesthesia also discussed to include death. Post-op care, work, activity and restrictions which included the use of pain medication and possibility of using blood thinner post op were also discussed with Izabel Salazar and he verbalized and agreed with the restrictions. PLAN:  REMOVAL OF LEFT LOWER EXTREMITY EXTERNAL FIXATOR WITH OPEN REDUCTION AND INTERNAL FIXATION OF LEFT TIBIAL PLATEAU FRACTURE     Your surgery is scheduled for Thursday, 6/24/21 at 10:30 AM  with Dr. Ranae Landau, MD at the [facility] . You will need to report to Preop area  that morning at 7:30 AM     You are having Outpatient surgery, but plan for 1-2 nights in the hospital for recovery if needed.  Preadmission Testing (PAT) department at [facility] will contact you with all the details prior to surgery.  Nothing to eat or drink after midnight the night before surgery. You may take a pain pill and any other medicine PAT instructs you to take with small sip of water if needed.  Maintain dressings to top pin sites. Start wet to dry dressings twice daily to bottom pin sites. Continue monitoring for worsening infection and if this occurs, call the office or go to the ER. Border of cellulitic infx marked and instructed patient to return to care if advancing erythema beyond this border.  Start taking Bactrim (antibiotic) sent to the pharmacy today.     Continue with ice and elevation to reduce swelling   No weight bearing left lower extremity, use assistive devices   Take pain medicine as instructed   Continue ASA BID  If you are taking Aspirin/Lovenox, hold the day of surgery. Hold all NSAIDs (non-steroidal anti inflammatories like Advil, motrin, ibuprofen, etc) 7 days prior to surgery   COVID testing day of surgery     Call office with any question or concerns: [phone]    All surgical patients will be gradually tapered off narcotic pain medication post operatively, this office will not continue narcotics longer than 6 weeks from date of surgery. If narcotics are required longer than this time period you will be referred to pain management. Electronically signed by Tony Engel PA-C on 6/16/2021 at 10:28 AM  Note: This report was completed using [employer] voiced recognition software. Every effort has been made to ensure accuracy; however, inadvertent computerized transcription errors may be present.

## 2021-06-16 NOTE — H&P (VIEW-ONLY)
appetite change and fatigue. HENT: Negative for dental issues, hearing loss and tinnitus. Negative for congestion, sinus pressure, sneezing, sore throat. Negative for headache. Eyes: Negative for visual disturbance, blurred and double vision. Negative for pain, discharge, redness and itching  Respiratory: Negative for cough, shortness of breath and wheezing. Cardiovascular: Negative for chest pain, palpitations and leg swelling. No dyspnea on exertion   Gastrointestinal:   Negative for nausea, vomiting, abdominal pain, diarrhea, constipation  or black or bloody. Hematologic\Lymphatic:  negative for bleeding, petechiae,   Genitourinary: Negative for hematuria and difficulty urinating. Musculoskeletal: Negative for neck pain and stiffness. Mild for back pain, negative joint swelling and gait problem. Skin: Negative for pallor, rash and wound. Neurological: Negative for dizziness, tremors, seizures, weakness, light-headedness, no TIA or stroke symptoms. No numbness and headaches. Psychiatric/Behavioral: Negative.      Physical Examination:   General appearance: alert, well appearing, and in no distress,  normal appearing weight  Mental status: alert, oriented to person, place, and time, normal mood, behavior, speech, dress, motor activity, and thought processes  Abdomen: soft, nondistended   Resp:   resp easy and unlabored, no audible wheezes note  Cardiac: distal pulses palpable, skin well perfused  Neurological: alert, oriented X3, normal speech, no focal findings or movement disorder noted, motor and sensory grossly normal bilaterally, normal muscle tone, no tremors, strength 5/5, normal gait and station  HEENT: normochephalic atraumatic, external ears and eyes normal, sclera normal, neck supple  Extremities:   peripheral pulses normal, no edema, redness or tenderness in the calves   Skin: normal coloration, no rashes or open wounds, no suspicious skin lesions noted  Psych: Affect euthymic Musculoskeletal:    Extremity:  Left Lower Extremity  L LE external fixator intact without gross loosening  Large area of erythematous, slightly warm, blanching noted to distal anterior L leg overlying distal pin sites, characteristic of cellulitis. Distal pin sites with slough, no odor or expressible or active drainage. Nontender about this area to palpation. Still exhibits good AROM of the toes and ankle   Wrinkle test +  Compartments supple throughout thigh and leg  Calf supple and nontender  States sensation intact to touch in sural/deep peroneal/superficial peroneal/saphenous/posterior tibial nerve distributions to foot/ankle. Palpable dorsalis pedis and posterior tibialis pulses, cap refill brisk in toes, foot warm/perfused. Temp 98.8 °F (37.1 °C) (Oral)      XR: 6/16/21     3 views of L knee demonstrating external fixator appearing intact without loosening or failure. Comminuted, bicondylar tibial plateau fracture and proximal fibular fracture noted. No significant change in alignment. No other acute fractures or dislocations or any other osseus abnormality identified. ASSESSMENT:     Diagnosis Orders   1. Cellulitis of left lower extremity  sulfamethoxazole-trimethoprim (BACTRIM DS) 800-160 MG per tablet       Discussion:  Had lengthy discussion with patient regarding His diagnosis, typical prognosis, and expected outcomes. I reviewed the possible complications from the injury itself despite treatment choosen. I also discussed treatment options including nonoperative managements versus surgical management, along with risks and benefits of each. They have elected for operative management at this time. Discussed with patient factors that can impact patient's fracture healing. Patient has the following risk factors for union: none    Risk, benefits and treatment options discussed with Ana Tapia. he has verbalized understanding of options.  The possibility of complications  If you are taking Aspirin/Lovenox, hold the day of surgery. Hold all NSAIDs (non-steroidal anti inflammatories like Advil, motrin, ibuprofen, etc) 7 days prior to surgery   COVID testing day of surgery     Call office with any question or concerns: [phone]    All surgical patients will be gradually tapered off narcotic pain medication post operatively, this office will not continue narcotics longer than 6 weeks from date of surgery. If narcotics are required longer than this time period you will be referred to pain management.

## 2021-06-16 NOTE — H&P
Orthopaedic H&P Note    Amparo Wang is a 53 y.o. male, his YOB: 1970 with the following history as recorded in [facility]:      Patient Active Problem List    Diagnosis Date Noted    Tibial plateau fracture, left, closed, initial encounter 06/06/2021     Current Outpatient Medications   Medication Sig Dispense Refill    oxyCODONE-acetaminophen (PERCOCET) 5-325 MG per tablet Take 1 tablet by mouth every 4 hours as needed for Pain.  aspirin 325 MG EC tablet Take 1 tablet by mouth 2 times daily for 28 days 56 tablet 0     No current facility-administered medications for this visit. Allergies: Patient has no known allergies. No past medical history on file. Past Surgical History:   Procedure Laterality Date    LEG SURGERY Left 6/6/2021    LEG EXTERNAL FIXATOR APPLICATION, LEFT performed by Will Redmond MD at [address] [place] [address]     No family history on file. Social History     Tobacco Use    Smoking status: Never Smoker    Smokeless tobacco: Never Used   Substance Use Topics    Alcohol use: Not on file                             Chief Complaint   Patient presents with    Follow-up     Left Tib plateau ex fix       SUBJECTIVE: Amparo Wang is here for initial evaluation in the orthopedic trauma office for left lower extremity tibial plateau fracture with external fixation by Dr. Va Rodrigues on 6/6/21. He has maintained nonweightbearing restrictions to the left lower extremity as instructed continues to take aspirin twice daily for DVT prophylaxis. States that he has no pain currently and no paresthesias and is overall doing well and denies fever, chills, malaise, calf pain, chest pain, shortness of breath. No history of nicotine use. BMI is 31.54. Here today for surgery set up for removal of external fixator with ORIF of left tibial plateau fracture. No significant past medical or past surgical history.     Review of Systems   Constitutional: Negative for fever, chills, diaphoresis, appetite change and fatigue. HENT: Negative for dental issues, hearing loss and tinnitus. Negative for congestion, sinus pressure, sneezing, sore throat. Negative for headache. Eyes: Negative for visual disturbance, blurred and double vision. Negative for pain, discharge, redness and itching  Respiratory: Negative for cough, shortness of breath and wheezing. Cardiovascular: Negative for chest pain, palpitations and leg swelling. No dyspnea on exertion   Gastrointestinal:   Negative for nausea, vomiting, abdominal pain, diarrhea, constipation  or black or bloody. Hematologic\Lymphatic:  negative for bleeding, petechiae,   Genitourinary: Negative for hematuria and difficulty urinating. Musculoskeletal: Negative for neck pain and stiffness. Mild for back pain, negative joint swelling and gait problem. Skin: Negative for pallor, rash and wound. Neurological: Negative for dizziness, tremors, seizures, weakness, light-headedness, no TIA or stroke symptoms. No numbness and headaches. Psychiatric/Behavioral: Negative.      Physical Examination:   General appearance: alert, well appearing, and in no distress,  normal appearing weight  Mental status: alert, oriented to person, place, and time, normal mood, behavior, speech, dress, motor activity, and thought processes  Abdomen: soft, nondistended   Resp:   resp easy and unlabored, no audible wheezes note  Cardiac: distal pulses palpable, skin well perfused  Neurological: alert, oriented X3, normal speech, no focal findings or movement disorder noted, motor and sensory grossly normal bilaterally, normal muscle tone, no tremors, strength 5/5, normal gait and station  HEENT: normochephalic atraumatic, external ears and eyes normal, sclera normal, neck supple  Extremities:   peripheral pulses normal, no edema, redness or tenderness in the calves   Skin: normal coloration, no rashes or open wounds, no suspicious skin lesions noted  Psych: Affect euthymic Musculoskeletal:    Extremity:  Left Lower Extremity  L LE external fixator intact without gross loosening  Large area of erythematous, slightly warm, blanching noted to distal anterior L leg overlying distal pin sites, characteristic of cellulitis. Distal pin sites with slough, no odor or expressible or active drainage. Nontender about this area to palpation. Still exhibits good AROM of the toes and ankle   Wrinkle test +  Compartments supple throughout thigh and leg  Calf supple and nontender  States sensation intact to touch in sural/deep peroneal/superficial peroneal/saphenous/posterior tibial nerve distributions to foot/ankle. Palpable dorsalis pedis and posterior tibialis pulses, cap refill brisk in toes, foot warm/perfused. Temp 98.8 °F (37.1 °C) (Oral)      XR: 6/16/21     3 views of L knee demonstrating external fixator appearing intact without loosening or failure. Comminuted, bicondylar tibial plateau fracture and proximal fibular fracture noted. No significant change in alignment. No other acute fractures or dislocations or any other osseus abnormality identified. ASSESSMENT:     Diagnosis Orders   1. Cellulitis of left lower extremity  sulfamethoxazole-trimethoprim (BACTRIM DS) 800-160 MG per tablet       Discussion:  Had lengthy discussion with patient regarding His diagnosis, typical prognosis, and expected outcomes. I reviewed the possible complications from the injury itself despite treatment choosen. I also discussed treatment options including nonoperative managements versus surgical management, along with risks and benefits of each. They have elected for operative management at this time. Discussed with patient factors that can impact patient's fracture healing. Patient has the following risk factors for union: none    Risk, benefits and treatment options discussed with Cori Marquez. he has verbalized understanding of options.  The possibility of complications were also discussed to include but not limited to nerve damage, infection, problems with wound healing, vascular injury, chronic pain, stiffness, dysfunction, nonhealing of the bone, symptomatic hardware and/or its failure, need for subsequent surgery, dislocation, and blood clots as well as medical related problems and other problems not specifically discussed. Risk of anesthesia also discussed to include death. Post-op care, work, activity and restrictions which included the use of pain medication and possibility of using blood thinner post op were also discussed with Rafia Coronado and he verbalized and agreed with the restrictions. PLAN:  REMOVAL OF LEFT LOWER EXTREMITY EXTERNAL FIXATOR WITH OPEN REDUCTION AND INTERNAL FIXATION OF LEFT TIBIAL PLATEAU FRACTURE     Your surgery is scheduled for Thursday, 6/24/21 at 10:30 AM  with Dr. Swapnil Banuelos MD at the [address] in [facility] . You will need to report to Preop area  that morning at 7:30 AM     You are having Outpatient surgery, but plan for 1-2 nights in the hospital for recovery if needed.  Preadmission Testing (PAT) department at [facility] will contact you with all the details prior to surgery.  Nothing to eat or drink after midnight the night before surgery. You may take a pain pill and any other medicine PAT instructs you to take with small sip of water if needed.  Maintain dressings to top pin sites. Start wet to dry dressings twice daily to bottom pin sites. Continue monitoring for worsening infection and if this occurs, call the office or go to the ER. Border of cellulitic infx marked and instructed patient to return to care if advancing erythema beyond this border.  Start taking Bactrim (antibiotic) sent to the pharmacy today.     Continue with ice and elevation to reduce swelling   No weight bearing left lower extremity, use assistive devices   Take pain medicine as instructed   Continue ASA BID    If you are taking Aspirin/Lovenox, hold the day of surgery. Hold all NSAIDs (non-steroidal anti inflammatories like Advil, motrin, ibuprofen, etc) 7 days prior to surgery   COVID testing day of surgery     Call office with any question or concerns: [phone]    All surgical patients will be gradually tapered off narcotic pain medication post operatively, this office will not continue narcotics longer than 6 weeks from date of surgery. If narcotics are required longer than this time period you will be referred to pain management.
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
## 2021-06-16 NOTE — PATIENT INSTRUCTIONS
REMOVAL OF LEFT LOWER EXTREMITY EXTERNAL FIXATOR WITH OPEN REDUCTION AND INTERNAL FIXATION OF LEFT TIBIAL PLATEAU FRACTURE     Your surgery is scheduled for Thursday, 6/24/21 at 10:30 AM  with Dr. Suzy Lizama MD at the [facility] . You will need to report to Preop area  that morning at 7:30 AM     You are having Outpatient surgery, but plan for 1-2 nights in the hospital for recovery if needed.  Preadmission Testing (PAT) department at [facility] will contact you with all the details prior to surgery.  Nothing to eat or drink after midnight the night before surgery. You may take a pain pill and any other medicine PAT instructs you to take with small sip of water if needed.  Maintain dressings to top pin sites. Start wet to dry dressings twice daily to bottom pin sites. Continue monitoring for worsening infection and if this occurs, call the office or go to the ER.  Start taking Bactrim (antibiotic) sent to the pharmacy today.  Continue with ice and elevation to reduce swelling   No weight bearing left lower extremity, use assistive devices   Take pain medicine as instructed   If you are taking Aspirin/Lovenox, hold the day of surgery. Hold all NSAIDs (non-steroidal anti inflammatories like Advil, motrin, ibuprofen, etc) 7 days prior to surgery    Call office with any question or concerns: [phone]    All surgical patients will be gradually tapered off narcotic pain medication post operatively, this office will not continue narcotics longer than 6 weeks from date of surgery. If narcotics are required longer than this time period you will be referred to pain management.

## 2021-06-17 NOTE — PROGRESS NOTES
Patient agreed to COVID test on 6-24 at the  [facility], drive thru 7 am- 10 am, walk in 8 am - 4 pm located at  [address] [place] [address]. Patient instructed to bring ID. Patient instructed to self isolate until day of surgery.

## 2021-06-18 ENCOUNTER — HOSPITAL ENCOUNTER (OUTPATIENT)
Age: 51
Discharge: HOME | End: 2021-06-20
Payer: MEDICARE

## 2021-06-18 DIAGNOSIS — U07.1 COVID-19: ICD-10-CM

## 2021-06-18 PROCEDURE — U0005 INFEC AGEN DETEC AMPLI PROBE: HCPCS

## 2021-06-18 PROCEDURE — U0003 INFECTIOUS AGENT DETECTION BY NUCLEIC ACID (DNA OR RNA); SEVERE ACUTE RESPIRATORY SYNDROME CORONAVIRUS 2 (SARS-COV-2) (CORONAVIRUS DISEASE [COVID-19]), AMPLIFIED PROBE TECHNIQUE, MAKING USE OF HIGH THROUGHPUT TECHNOLOGIES AS DESCRIBED BY CMS-2020-01-R: HCPCS

## 2021-06-18 NOTE — PROGRESS NOTES
spend the night in the hospital.     PARKING INSTRUCTIONS:   [x] Arrival Time:____0730______  · [x] Parking lot '\"I\"  is located on [facility] (the corner of [facility] and [facility]). To enter, press the button and the gate will lift. A free token will be provided to exit the lot. One car per patient is allowed to park in this lot. All other cars are to park on [address] [place] [address] either in the parking garage or the handicap lot. [] To reach the [facility] lobby from [address] [place] [address], upon entering the hospital, take elevator B to the 3rd floor. EDUCATION INSTRUCTIONS:      [] Knee or hip replacement booklet & exercise pamphlets given. [] Kaiden Valenzuela placed in chart. [] Pre-admission Testing educational folder given  [] Incentive Spirometry,coughing & deep breathing exercises reviewed. []Medication information sheet(s)   []Fluoroscopy-Xray used in surgery reviewed with patient. Educational pamphlet placed in chart. []Pain: Post-op pain is normal and to be expected. You will be asked to rate your pain from 0-10(a zero is not acceptable-education is needed). Your post-op pain goal is:  [] Ask your nurse for your pain medication. [] Joint camp offered. [] Joint replacement booklets given. [] Other:___________________________    MEDICATION INSTRUCTIONS:   [x]Bring a complete list of your medications, please write the last time you took the medicine, give this list to the nurse. [x] Take the following medications the morning of surgery with 1-2 ounces of water:  No meds  [x] Stop herbal supplements and vitamins 5 days before your surgery. [] DO NOT take any diabetic medicine the morning of surgery. Follow instructions for insulin the day before surgery. [] If you are diabetic and your blood sugar is low or you feel symptomatic, you may drink 1-2 ounces of apple juice or take a glucose tablet.   The morning of your procedure, you may call the pre-op area if you

## 2021-06-20 LAB — SARS-COV-2, PCR: NOT DETECTED

## 2021-06-23 ENCOUNTER — ANESTHESIA EVENT (OUTPATIENT)
Dept: OPERATING ROOM | Age: 51
End: 2021-06-23
Payer: MEDICARE

## 2021-06-24 ENCOUNTER — APPOINTMENT (OUTPATIENT)
Dept: GENERAL RADIOLOGY | Age: 51
End: 2021-06-24
Attending: ORTHOPAEDIC SURGERY
Payer: MEDICARE

## 2021-06-24 ENCOUNTER — HOSPITAL ENCOUNTER (OUTPATIENT)
Age: 51
Setting detail: OBSERVATION
LOS: 1 days | Discharge: HOME | End: 2021-06-25
Attending: ORTHOPAEDIC SURGERY | Admitting: ORTHOPAEDIC SURGERY
Payer: MEDICARE

## 2021-06-24 ENCOUNTER — ANESTHESIA (OUTPATIENT)
Dept: OPERATING ROOM | Age: 51
End: 2021-06-24
Payer: MEDICARE

## 2021-06-24 VITALS — DIASTOLIC BLOOD PRESSURE: 89 MMHG | OXYGEN SATURATION: 99 % | SYSTOLIC BLOOD PRESSURE: 150 MMHG | TEMPERATURE: 97.3 F

## 2021-06-24 DIAGNOSIS — U07.1 COVID-19: Primary | ICD-10-CM

## 2021-06-24 DIAGNOSIS — S82.142A TIBIAL PLATEAU FRACTURE, LEFT, CLOSED, INITIAL ENCOUNTER: ICD-10-CM

## 2021-06-24 LAB
ALBUMIN SERPL-MCNC: 3.9 G/DL (ref 3.5–5.2)
ALP BLD-CCNC: 123 U/L (ref 40–129)
ALT SERPL-CCNC: 21 U/L (ref 0–40)
ANION GAP SERPL CALCULATED.3IONS-SCNC: 9 MMOL/L (ref 7–16)
AST SERPL-CCNC: 17 U/L (ref 0–39)
BASOPHILS ABSOLUTE: 0.11 E9/L (ref 0–0.2)
BASOPHILS RELATIVE PERCENT: 1.2 % (ref 0–2)
BILIRUB SERPL-MCNC: 0.5 MG/DL (ref 0–1.2)
BUN BLDV-MCNC: 16 MG/DL (ref 6–20)
CALCIUM SERPL-MCNC: 9.3 MG/DL (ref 8.6–10.2)
CHLORIDE BLD-SCNC: 100 MMOL/L (ref 98–107)
CO2: 27 MMOL/L (ref 22–29)
CREAT SERPL-MCNC: 1.1 MG/DL (ref 0.7–1.2)
EOSINOPHILS ABSOLUTE: 1.1 E9/L (ref 0.05–0.5)
EOSINOPHILS RELATIVE PERCENT: 12 % (ref 0–6)
GFR AFRICAN AMERICAN: >60
GFR NON-AFRICAN AMERICAN: >60 ML/MIN/1.73
GLUCOSE BLD-MCNC: 116 MG/DL (ref 74–99)
HCT VFR BLD CALC: 42.6 % (ref 37–54)
HEMOGLOBIN: 14.3 G/DL (ref 12.5–16.5)
IMMATURE GRANULOCYTES #: 0.05 E9/L
IMMATURE GRANULOCYTES %: 0.5 % (ref 0–5)
LYMPHOCYTES ABSOLUTE: 1.36 E9/L (ref 1.5–4)
LYMPHOCYTES RELATIVE PERCENT: 14.8 % (ref 20–42)
MCH RBC QN AUTO: 30.4 PG (ref 26–35)
MCHC RBC AUTO-ENTMCNC: 33.6 % (ref 32–34.5)
MCV RBC AUTO: 90.6 FL (ref 80–99.9)
MONOCYTES ABSOLUTE: 0.92 E9/L (ref 0.1–0.95)
MONOCYTES RELATIVE PERCENT: 10 % (ref 2–12)
NEUTROPHILS ABSOLUTE: 5.62 E9/L (ref 1.8–7.3)
NEUTROPHILS RELATIVE PERCENT: 61.5 % (ref 43–80)
PDW BLD-RTO: 12.1 FL (ref 11.5–15)
PLATELET # BLD: 374 E9/L (ref 130–450)
PMV BLD AUTO: 9.4 FL (ref 7–12)
POTASSIUM SERPL-SCNC: 4.3 MMOL/L (ref 3.5–5)
RBC # BLD: 4.7 E12/L (ref 3.8–5.8)
SODIUM BLD-SCNC: 136 MMOL/L (ref 132–146)
TOTAL PROTEIN: 7.5 G/DL (ref 6.4–8.3)
WBC # BLD: 9.2 E9/L (ref 4.5–11.5)

## 2021-06-24 PROCEDURE — 2720000010 HC SURG SUPPLY STERILE: Performed by: ORTHOPAEDIC SURGERY

## 2021-06-24 PROCEDURE — 7100000001 HC PACU RECOVERY - ADDTL 15 MIN: Performed by: ORTHOPAEDIC SURGERY

## 2021-06-24 PROCEDURE — 6370000000 HC RX 637 (ALT 250 FOR IP): Performed by: STUDENT IN AN ORGANIZED HEALTH CARE EDUCATION/TRAINING PROGRAM

## 2021-06-24 PROCEDURE — 2580000003 HC RX 258: Performed by: STUDENT IN AN ORGANIZED HEALTH CARE EDUCATION/TRAINING PROGRAM

## 2021-06-24 PROCEDURE — 6360000002 HC RX W HCPCS: Performed by: ANESTHESIOLOGY

## 2021-06-24 PROCEDURE — 6360000002 HC RX W HCPCS: Performed by: STUDENT IN AN ORGANIZED HEALTH CARE EDUCATION/TRAINING PROGRAM

## 2021-06-24 PROCEDURE — 27536 TREAT KNEE FRACTURE: CPT | Performed by: ORTHOPAEDIC SURGERY

## 2021-06-24 PROCEDURE — 7100000000 HC PACU RECOVERY - FIRST 15 MIN: Performed by: ORTHOPAEDIC SURGERY

## 2021-06-24 PROCEDURE — 36415 COLL VENOUS BLD VENIPUNCTURE: CPT

## 2021-06-24 PROCEDURE — 73590 X-RAY EXAM OF LOWER LEG: CPT

## 2021-06-24 PROCEDURE — 2500000003 HC RX 250 WO HCPCS

## 2021-06-24 PROCEDURE — C1713 ANCHOR/SCREW BN/BN,TIS/BN: HCPCS | Performed by: ORTHOPAEDIC SURGERY

## 2021-06-24 PROCEDURE — 6360000002 HC RX W HCPCS

## 2021-06-24 PROCEDURE — 85025 COMPLETE CBC W/AUTO DIFF WBC: CPT

## 2021-06-24 PROCEDURE — 73560 X-RAY EXAM OF KNEE 1 OR 2: CPT

## 2021-06-24 PROCEDURE — 2709999900 HC NON-CHARGEABLE SUPPLY: Performed by: ORTHOPAEDIC SURGERY

## 2021-06-24 PROCEDURE — 3600000015 HC SURGERY LEVEL 5 ADDTL 15MIN: Performed by: ORTHOPAEDIC SURGERY

## 2021-06-24 PROCEDURE — G0378 HOSPITAL OBSERVATION PER HR: HCPCS

## 2021-06-24 PROCEDURE — 20694 RMVL EXT FIXJ SYS UNDER ANES: CPT | Performed by: ORTHOPAEDIC SURGERY

## 2021-06-24 PROCEDURE — 76942 ECHO GUIDE FOR BIOPSY: CPT | Performed by: ANESTHESIOLOGY

## 2021-06-24 PROCEDURE — 6360000002 HC RX W HCPCS: Performed by: PHYSICIAN ASSISTANT

## 2021-06-24 PROCEDURE — 3600000005 HC SURGERY LEVEL 5 BASE: Performed by: ORTHOPAEDIC SURGERY

## 2021-06-24 PROCEDURE — 3700000001 HC ADD 15 MINUTES (ANESTHESIA): Performed by: ORTHOPAEDIC SURGERY

## 2021-06-24 PROCEDURE — 3700000000 HC ANESTHESIA ATTENDED CARE: Performed by: ORTHOPAEDIC SURGERY

## 2021-06-24 PROCEDURE — 2580000003 HC RX 258: Performed by: PHYSICIAN ASSISTANT

## 2021-06-24 PROCEDURE — L3650 SO 8 ABD RESTRAINT PRE OTS: HCPCS | Performed by: ORTHOPAEDIC SURGERY

## 2021-06-24 PROCEDURE — 80053 COMPREHEN METABOLIC PANEL: CPT

## 2021-06-24 PROCEDURE — 3209999900 FLUORO FOR SURGICAL PROCEDURES

## 2021-06-24 DEVICE — SCREW BNE L58MM DIA4.5MM PROX CORT TIB S STL ST LOK FULL: Type: IMPLANTABLE DEVICE | Status: FUNCTIONAL

## 2021-06-24 DEVICE — SCREW BNE L90MM DIA5MM S STL ST LOK FULL THRD T25 STARDRV: Type: IMPLANTABLE DEVICE | Status: FUNCTIONAL

## 2021-06-24 DEVICE — SCREW BNE L36MM DIA4.5MM PROX CORT TIB S STL ST LOK FULL: Type: IMPLANTABLE DEVICE | Site: LEG | Status: FUNCTIONAL

## 2021-06-24 DEVICE — SCREW BNE L75MM DIA5MM S STL ST LOK FULL THRD T25 STARDRV: Type: IMPLANTABLE DEVICE | Status: FUNCTIONAL

## 2021-06-24 DEVICE — IMPLANTABLE DEVICE: Type: IMPLANTABLE DEVICE | Site: LEG | Status: FUNCTIONAL

## 2021-06-24 DEVICE — IMPLANTABLE DEVICE: Type: IMPLANTABLE DEVICE | Status: FUNCTIONAL

## 2021-06-24 DEVICE — SCREW BNE L30MM DIA4.5MM PROX CORT TIB S STL ST LOK FULL: Type: IMPLANTABLE DEVICE | Site: LEG | Status: FUNCTIONAL

## 2021-06-24 DEVICE — SCREW BNE L85MM DIA5MM S STL ST LOK FULL THRD T25 STARDRV: Type: IMPLANTABLE DEVICE | Status: FUNCTIONAL

## 2021-06-24 DEVICE — SCREW BNE L40MM DIA4.5MM PROX CORT TIB S STL ST LOK FULL: Type: IMPLANTABLE DEVICE | Status: FUNCTIONAL

## 2021-06-24 RX ORDER — FENTANYL CITRATE 50 UG/ML
50 INJECTION, SOLUTION INTRAMUSCULAR; INTRAVENOUS EVERY 10 MIN PRN
Status: DISCONTINUED | OUTPATIENT
Start: 2021-06-24 | End: 2021-06-24 | Stop reason: HOSPADM

## 2021-06-24 RX ORDER — POLYETHYLENE GLYCOL 3350 17 G/17G
17 POWDER, FOR SOLUTION ORAL DAILY PRN
Status: DISCONTINUED | OUTPATIENT
Start: 2021-06-24 | End: 2021-06-25 | Stop reason: HOSPADM

## 2021-06-24 RX ORDER — ASPIRIN 81 MG/1
81 TABLET ORAL 2 TIMES DAILY
Status: DISCONTINUED | OUTPATIENT
Start: 2021-06-24 | End: 2021-06-25 | Stop reason: HOSPADM

## 2021-06-24 RX ORDER — SODIUM CHLORIDE 0.9 % (FLUSH) 0.9 %
10 SYRINGE (ML) INJECTION PRN
Status: DISCONTINUED | OUTPATIENT
Start: 2021-06-24 | End: 2021-06-25 | Stop reason: HOSPADM

## 2021-06-24 RX ORDER — SODIUM CHLORIDE 9 MG/ML
25 INJECTION, SOLUTION INTRAVENOUS PRN
Status: DISCONTINUED | OUTPATIENT
Start: 2021-06-24 | End: 2021-06-24 | Stop reason: HOSPADM

## 2021-06-24 RX ORDER — MORPHINE SULFATE 2 MG/ML
2 INJECTION, SOLUTION INTRAMUSCULAR; INTRAVENOUS
Status: DISCONTINUED | OUTPATIENT
Start: 2021-06-24 | End: 2021-06-24 | Stop reason: SDUPTHER

## 2021-06-24 RX ORDER — OXYCODONE HYDROCHLORIDE 5 MG/1
5 TABLET ORAL EVERY 4 HOURS PRN
Status: DISCONTINUED | OUTPATIENT
Start: 2021-06-24 | End: 2021-06-24 | Stop reason: SDUPTHER

## 2021-06-24 RX ORDER — PROMETHAZINE HYDROCHLORIDE 25 MG/ML
6.25 INJECTION, SOLUTION INTRAMUSCULAR; INTRAVENOUS
Status: DISCONTINUED | OUTPATIENT
Start: 2021-06-24 | End: 2021-06-24 | Stop reason: HOSPADM

## 2021-06-24 RX ORDER — SODIUM CHLORIDE 9 MG/ML
25 INJECTION, SOLUTION INTRAVENOUS PRN
Status: DISCONTINUED | OUTPATIENT
Start: 2021-06-24 | End: 2021-06-25 | Stop reason: HOSPADM

## 2021-06-24 RX ORDER — LIDOCAINE HYDROCHLORIDE 20 MG/ML
INJECTION, SOLUTION INTRAVENOUS PRN
Status: DISCONTINUED | OUTPATIENT
Start: 2021-06-24 | End: 2021-06-24 | Stop reason: SDUPTHER

## 2021-06-24 RX ORDER — SODIUM CHLORIDE 0.9 % (FLUSH) 0.9 %
10 SYRINGE (ML) INJECTION EVERY 12 HOURS SCHEDULED
Status: DISCONTINUED | OUTPATIENT
Start: 2021-06-24 | End: 2021-06-25 | Stop reason: HOSPADM

## 2021-06-24 RX ORDER — SULFAMETHOXAZOLE AND TRIMETHOPRIM 800; 160 MG/1; MG/1
1 TABLET ORAL 2 TIMES DAILY
Qty: 14 TABLET | Refills: 0 | Status: SHIPPED | OUTPATIENT
Start: 2021-06-24 | End: 2021-07-13

## 2021-06-24 RX ORDER — MIDAZOLAM HYDROCHLORIDE 2 MG/2ML
1 INJECTION, SOLUTION INTRAMUSCULAR; INTRAVENOUS PRN
Status: DISCONTINUED | OUTPATIENT
Start: 2021-06-24 | End: 2021-06-24 | Stop reason: HOSPADM

## 2021-06-24 RX ORDER — HYDRALAZINE HYDROCHLORIDE 20 MG/ML
5 INJECTION INTRAMUSCULAR; INTRAVENOUS
Status: DISCONTINUED | OUTPATIENT
Start: 2021-06-24 | End: 2021-06-24 | Stop reason: HOSPADM

## 2021-06-24 RX ORDER — OXYCODONE HYDROCHLORIDE AND ACETAMINOPHEN 5; 325 MG/1; MG/1
1 TABLET ORAL EVERY 6 HOURS PRN
Qty: 28 TABLET | Refills: 0 | Status: SHIPPED | OUTPATIENT
Start: 2021-06-24 | End: 2021-07-01

## 2021-06-24 RX ORDER — SENNA AND DOCUSATE SODIUM 50; 8.6 MG/1; MG/1
1 TABLET, FILM COATED ORAL 2 TIMES DAILY
Status: DISCONTINUED | OUTPATIENT
Start: 2021-06-24 | End: 2021-06-25 | Stop reason: HOSPADM

## 2021-06-24 RX ORDER — MIDAZOLAM HYDROCHLORIDE 1 MG/ML
INJECTION INTRAMUSCULAR; INTRAVENOUS PRN
Status: DISCONTINUED | OUTPATIENT
Start: 2021-06-24 | End: 2021-06-24 | Stop reason: SDUPTHER

## 2021-06-24 RX ORDER — ACETAMINOPHEN 325 MG/1
650 TABLET ORAL EVERY 6 HOURS
Status: DISCONTINUED | OUTPATIENT
Start: 2021-06-24 | End: 2021-06-25 | Stop reason: HOSPADM

## 2021-06-24 RX ORDER — ONDANSETRON 4 MG/1
4 TABLET, ORALLY DISINTEGRATING ORAL EVERY 8 HOURS PRN
Status: DISCONTINUED | OUTPATIENT
Start: 2021-06-24 | End: 2021-06-25 | Stop reason: HOSPADM

## 2021-06-24 RX ORDER — SODIUM CHLORIDE 9 MG/ML
INJECTION, SOLUTION INTRAVENOUS CONTINUOUS
Status: ACTIVE | OUTPATIENT
Start: 2021-06-24 | End: 2021-06-25

## 2021-06-24 RX ORDER — ASPIRIN 81 MG/1
81 TABLET, CHEWABLE ORAL 2 TIMES DAILY
Status: DISCONTINUED | OUTPATIENT
Start: 2021-06-24 | End: 2021-06-24 | Stop reason: SDUPTHER

## 2021-06-24 RX ORDER — KETOROLAC TROMETHAMINE 30 MG/ML
INJECTION, SOLUTION INTRAMUSCULAR; INTRAVENOUS PRN
Status: DISCONTINUED | OUTPATIENT
Start: 2021-06-24 | End: 2021-06-24 | Stop reason: SDUPTHER

## 2021-06-24 RX ORDER — ONDANSETRON 2 MG/ML
4 INJECTION INTRAMUSCULAR; INTRAVENOUS EVERY 6 HOURS PRN
Status: DISCONTINUED | OUTPATIENT
Start: 2021-06-24 | End: 2021-06-25 | Stop reason: HOSPADM

## 2021-06-24 RX ORDER — GLYCOPYRROLATE 1 MG/5 ML
SYRINGE (ML) INTRAVENOUS PRN
Status: DISCONTINUED | OUTPATIENT
Start: 2021-06-24 | End: 2021-06-24 | Stop reason: SDUPTHER

## 2021-06-24 RX ORDER — OXYCODONE HYDROCHLORIDE AND ACETAMINOPHEN 5; 325 MG/1; MG/1
1 TABLET ORAL
Status: DISCONTINUED | OUTPATIENT
Start: 2021-06-24 | End: 2021-06-24 | Stop reason: HOSPADM

## 2021-06-24 RX ORDER — ROPIVACAINE HYDROCHLORIDE 5 MG/ML
30 INJECTION, SOLUTION EPIDURAL; INFILTRATION; PERINEURAL ONCE
Status: COMPLETED | OUTPATIENT
Start: 2021-06-24 | End: 2021-06-24

## 2021-06-24 RX ORDER — MORPHINE SULFATE 4 MG/ML
4 INJECTION, SOLUTION INTRAMUSCULAR; INTRAVENOUS
Status: DISCONTINUED | OUTPATIENT
Start: 2021-06-24 | End: 2021-06-24 | Stop reason: SDUPTHER

## 2021-06-24 RX ORDER — OXYCODONE HYDROCHLORIDE 10 MG/1
10 TABLET ORAL EVERY 4 HOURS PRN
Status: DISCONTINUED | OUTPATIENT
Start: 2021-06-24 | End: 2021-06-24 | Stop reason: SDUPTHER

## 2021-06-24 RX ORDER — FENTANYL CITRATE 50 UG/ML
INJECTION, SOLUTION INTRAMUSCULAR; INTRAVENOUS PRN
Status: DISCONTINUED | OUTPATIENT
Start: 2021-06-24 | End: 2021-06-24 | Stop reason: SDUPTHER

## 2021-06-24 RX ORDER — MORPHINE SULFATE 2 MG/ML
2 INJECTION, SOLUTION INTRAMUSCULAR; INTRAVENOUS
Status: DISCONTINUED | OUTPATIENT
Start: 2021-06-24 | End: 2021-06-25 | Stop reason: HOSPADM

## 2021-06-24 RX ORDER — DEXAMETHASONE SODIUM PHOSPHATE 10 MG/ML
INJECTION INTRAMUSCULAR; INTRAVENOUS PRN
Status: DISCONTINUED | OUTPATIENT
Start: 2021-06-24 | End: 2021-06-24 | Stop reason: SDUPTHER

## 2021-06-24 RX ORDER — OXYCODONE HYDROCHLORIDE 5 MG/1
5 TABLET ORAL EVERY 4 HOURS PRN
Status: DISCONTINUED | OUTPATIENT
Start: 2021-06-24 | End: 2021-06-25 | Stop reason: HOSPADM

## 2021-06-24 RX ORDER — SODIUM CHLORIDE 0.9 % (FLUSH) 0.9 %
10 SYRINGE (ML) INJECTION PRN
Status: DISCONTINUED | OUTPATIENT
Start: 2021-06-24 | End: 2021-06-24 | Stop reason: HOSPADM

## 2021-06-24 RX ORDER — ONDANSETRON 2 MG/ML
INJECTION INTRAMUSCULAR; INTRAVENOUS PRN
Status: DISCONTINUED | OUTPATIENT
Start: 2021-06-24 | End: 2021-06-24 | Stop reason: SDUPTHER

## 2021-06-24 RX ORDER — OXYCODONE HYDROCHLORIDE 10 MG/1
10 TABLET ORAL EVERY 4 HOURS PRN
Status: DISCONTINUED | OUTPATIENT
Start: 2021-06-24 | End: 2021-06-25 | Stop reason: HOSPADM

## 2021-06-24 RX ORDER — ACETAMINOPHEN 325 MG/1
650 TABLET ORAL EVERY 6 HOURS
Status: DISCONTINUED | OUTPATIENT
Start: 2021-06-24 | End: 2021-06-24 | Stop reason: SDUPTHER

## 2021-06-24 RX ORDER — MORPHINE SULFATE 4 MG/ML
4 INJECTION, SOLUTION INTRAMUSCULAR; INTRAVENOUS
Status: DISCONTINUED | OUTPATIENT
Start: 2021-06-24 | End: 2021-06-25 | Stop reason: HOSPADM

## 2021-06-24 RX ORDER — SULFAMETHOXAZOLE AND TRIMETHOPRIM 800; 160 MG/1; MG/1
1 TABLET ORAL 2 TIMES DAILY
Status: DISCONTINUED | OUTPATIENT
Start: 2021-06-24 | End: 2021-06-25 | Stop reason: HOSPADM

## 2021-06-24 RX ORDER — LABETALOL HYDROCHLORIDE 5 MG/ML
2.5 INJECTION, SOLUTION INTRAVENOUS EVERY 10 MIN PRN
Status: DISCONTINUED | OUTPATIENT
Start: 2021-06-24 | End: 2021-06-24 | Stop reason: HOSPADM

## 2021-06-24 RX ORDER — SODIUM CHLORIDE 0.9 % (FLUSH) 0.9 %
10 SYRINGE (ML) INJECTION EVERY 12 HOURS SCHEDULED
Status: DISCONTINUED | OUTPATIENT
Start: 2021-06-24 | End: 2021-06-24 | Stop reason: HOSPADM

## 2021-06-24 RX ORDER — MEPERIDINE HYDROCHLORIDE 25 MG/ML
12.5 INJECTION INTRAMUSCULAR; INTRAVENOUS; SUBCUTANEOUS EVERY 5 MIN PRN
Status: DISCONTINUED | OUTPATIENT
Start: 2021-06-24 | End: 2021-06-24 | Stop reason: HOSPADM

## 2021-06-24 RX ORDER — PROPOFOL 10 MG/ML
INJECTION, EMULSION INTRAVENOUS PRN
Status: DISCONTINUED | OUTPATIENT
Start: 2021-06-24 | End: 2021-06-24 | Stop reason: SDUPTHER

## 2021-06-24 RX ORDER — SODIUM CHLORIDE 0.9 % (FLUSH) 0.9 %
5-40 SYRINGE (ML) INJECTION EVERY 12 HOURS SCHEDULED
Status: DISCONTINUED | OUTPATIENT
Start: 2021-06-24 | End: 2021-06-25 | Stop reason: HOSPADM

## 2021-06-24 RX ORDER — NEOSTIGMINE METHYLSULFATE 1 MG/ML
INJECTION, SOLUTION INTRAVENOUS PRN
Status: DISCONTINUED | OUTPATIENT
Start: 2021-06-24 | End: 2021-06-24 | Stop reason: SDUPTHER

## 2021-06-24 RX ORDER — SODIUM CHLORIDE 0.9 % (FLUSH) 0.9 %
5-40 SYRINGE (ML) INJECTION PRN
Status: DISCONTINUED | OUTPATIENT
Start: 2021-06-24 | End: 2021-06-25 | Stop reason: HOSPADM

## 2021-06-24 RX ORDER — ROCURONIUM BROMIDE 10 MG/ML
INJECTION, SOLUTION INTRAVENOUS PRN
Status: DISCONTINUED | OUTPATIENT
Start: 2021-06-24 | End: 2021-06-24 | Stop reason: SDUPTHER

## 2021-06-24 RX ORDER — ONDANSETRON 2 MG/ML
4 INJECTION INTRAMUSCULAR; INTRAVENOUS
Status: DISCONTINUED | OUTPATIENT
Start: 2021-06-24 | End: 2021-06-24 | Stop reason: HOSPADM

## 2021-06-24 RX ORDER — ASPIRIN 81 MG/1
81 TABLET ORAL 2 TIMES DAILY
Qty: 30 TABLET | Refills: 0 | Status: SHIPPED | OUTPATIENT
Start: 2021-06-24

## 2021-06-24 RX ADMIN — FENTANYL CITRATE 50 MCG: 50 INJECTION, SOLUTION INTRAMUSCULAR; INTRAVENOUS at 09:08

## 2021-06-24 RX ADMIN — PROPOFOL 300 MG: 10 INJECTION, EMULSION INTRAVENOUS at 10:28

## 2021-06-24 RX ADMIN — Medication 0.4 MG: at 12:35

## 2021-06-24 RX ADMIN — FENTANYL CITRATE 50 MCG: 50 INJECTION, SOLUTION INTRAMUSCULAR; INTRAVENOUS at 12:14

## 2021-06-24 RX ADMIN — ROCURONIUM BROMIDE 40 MG: 10 INJECTION, SOLUTION INTRAVENOUS at 11:15

## 2021-06-24 RX ADMIN — ACETAMINOPHEN 650 MG: 325 TABLET ORAL at 20:32

## 2021-06-24 RX ADMIN — ASPIRIN 81 MG: 81 TABLET, COATED ORAL at 20:32

## 2021-06-24 RX ADMIN — MIDAZOLAM 1 MG: 1 INJECTION INTRAMUSCULAR; INTRAVENOUS at 09:08

## 2021-06-24 RX ADMIN — SODIUM CHLORIDE: 9 INJECTION, SOLUTION INTRAVENOUS at 17:53

## 2021-06-24 RX ADMIN — FENTANYL CITRATE 50 MCG: 50 INJECTION, SOLUTION INTRAMUSCULAR; INTRAVENOUS at 11:15

## 2021-06-24 RX ADMIN — FENTANYL CITRATE 100 MCG: 50 INJECTION, SOLUTION INTRAMUSCULAR; INTRAVENOUS at 10:28

## 2021-06-24 RX ADMIN — DOCUSATE SODIUM 50 MG AND SENNOSIDES 8.6 MG 1 TABLET: 8.6; 5 TABLET, FILM COATED ORAL at 20:32

## 2021-06-24 RX ADMIN — SODIUM CHLORIDE: 9 INJECTION, SOLUTION INTRAVENOUS at 11:05

## 2021-06-24 RX ADMIN — DEXAMETHASONE SODIUM PHOSPHATE 10 MG: 10 INJECTION INTRAMUSCULAR; INTRAVENOUS at 10:28

## 2021-06-24 RX ADMIN — OXYCODONE HYDROCHLORIDE 10 MG: 10 TABLET ORAL at 20:31

## 2021-06-24 RX ADMIN — LIDOCAINE HYDROCHLORIDE 100 MG: 20 INJECTION, SOLUTION INTRAVENOUS at 10:28

## 2021-06-24 RX ADMIN — FENTANYL CITRATE 50 MCG: 50 INJECTION, SOLUTION INTRAMUSCULAR; INTRAVENOUS at 12:24

## 2021-06-24 RX ADMIN — ONDANSETRON HYDROCHLORIDE 4 MG: 2 INJECTION, SOLUTION INTRAMUSCULAR; INTRAVENOUS at 12:15

## 2021-06-24 RX ADMIN — SODIUM CHLORIDE: 9 INJECTION, SOLUTION INTRAVENOUS at 10:20

## 2021-06-24 RX ADMIN — MIDAZOLAM 1 MG: 1 INJECTION INTRAMUSCULAR; INTRAVENOUS at 09:11

## 2021-06-24 RX ADMIN — ROCURONIUM BROMIDE 30 MG: 10 INJECTION, SOLUTION INTRAVENOUS at 10:52

## 2021-06-24 RX ADMIN — ROCURONIUM BROMIDE 50 MG: 10 INJECTION, SOLUTION INTRAVENOUS at 10:28

## 2021-06-24 RX ADMIN — SULFAMETHOXAZOLE AND TRIMETHOPRIM 1 TABLET: 800; 160 TABLET ORAL at 20:32

## 2021-06-24 RX ADMIN — FENTANYL CITRATE 50 MCG: 50 INJECTION, SOLUTION INTRAMUSCULAR; INTRAVENOUS at 09:11

## 2021-06-24 RX ADMIN — ROPIVACAINE HYDROCHLORIDE 30 ML: 5 INJECTION, SOLUTION EPIDURAL; INFILTRATION; PERINEURAL at 09:19

## 2021-06-24 RX ADMIN — CEFAZOLIN 3000 MG: 1 INJECTION, POWDER, FOR SOLUTION INTRAMUSCULAR; INTRAVENOUS; PARENTERAL at 10:35

## 2021-06-24 RX ADMIN — KETOROLAC TROMETHAMINE 30 MG: 30 INJECTION, SOLUTION INTRAMUSCULAR; INTRAVENOUS at 12:35

## 2021-06-24 RX ADMIN — Medication 2000 MG: at 17:54

## 2021-06-24 RX ADMIN — Medication 3 MG: at 12:35

## 2021-06-24 ASSESSMENT — PULMONARY FUNCTION TESTS
PIF_VALUE: 22
PIF_VALUE: 23
PIF_VALUE: 14
PIF_VALUE: 12
PIF_VALUE: 23
PIF_VALUE: 22
PIF_VALUE: 4
PIF_VALUE: 22
PIF_VALUE: 23
PIF_VALUE: 0
PIF_VALUE: 22
PIF_VALUE: 23
PIF_VALUE: 22
PIF_VALUE: 37
PIF_VALUE: 22
PIF_VALUE: 21
PIF_VALUE: 22
PIF_VALUE: 23
PIF_VALUE: 0
PIF_VALUE: 29
PIF_VALUE: 22
PIF_VALUE: 22
PIF_VALUE: 0
PIF_VALUE: 22
PIF_VALUE: 22
PIF_VALUE: 12
PIF_VALUE: 15
PIF_VALUE: 12
PIF_VALUE: 12
PIF_VALUE: 15
PIF_VALUE: 22
PIF_VALUE: 23
PIF_VALUE: 12
PIF_VALUE: 22
PIF_VALUE: 2
PIF_VALUE: 23
PIF_VALUE: 12
PIF_VALUE: 3
PIF_VALUE: 1
PIF_VALUE: 22
PIF_VALUE: 23
PIF_VALUE: 22
PIF_VALUE: 16
PIF_VALUE: 22
PIF_VALUE: 5
PIF_VALUE: 23
PIF_VALUE: 20
PIF_VALUE: 22
PIF_VALUE: 22
PIF_VALUE: 16
PIF_VALUE: 15
PIF_VALUE: 14
PIF_VALUE: 23
PIF_VALUE: 22
PIF_VALUE: 23
PIF_VALUE: 22
PIF_VALUE: 22
PIF_VALUE: 23
PIF_VALUE: 22
PIF_VALUE: 1
PIF_VALUE: 25
PIF_VALUE: 23
PIF_VALUE: 23
PIF_VALUE: 1
PIF_VALUE: 22
PIF_VALUE: 23
PIF_VALUE: 23
PIF_VALUE: 6
PIF_VALUE: 23
PIF_VALUE: 22
PIF_VALUE: 23
PIF_VALUE: 23
PIF_VALUE: 22
PIF_VALUE: 24
PIF_VALUE: 22
PIF_VALUE: 22
PIF_VALUE: 26
PIF_VALUE: 22
PIF_VALUE: 22
PIF_VALUE: 23
PIF_VALUE: 22
PIF_VALUE: 25
PIF_VALUE: 12
PIF_VALUE: 22
PIF_VALUE: 13
PIF_VALUE: 22
PIF_VALUE: 23
PIF_VALUE: 12
PIF_VALUE: 23
PIF_VALUE: 22
PIF_VALUE: 16
PIF_VALUE: 12
PIF_VALUE: 14
PIF_VALUE: 23
PIF_VALUE: 22
PIF_VALUE: 16
PIF_VALUE: 1
PIF_VALUE: 19
PIF_VALUE: 22
PIF_VALUE: 23
PIF_VALUE: 14
PIF_VALUE: 16
PIF_VALUE: 25
PIF_VALUE: 25
PIF_VALUE: 23
PIF_VALUE: 16
PIF_VALUE: 0
PIF_VALUE: 23
PIF_VALUE: 23
PIF_VALUE: 22
PIF_VALUE: 17
PIF_VALUE: 11
PIF_VALUE: 23
PIF_VALUE: 22
PIF_VALUE: 11

## 2021-06-24 ASSESSMENT — PAIN SCALES - GENERAL
PAINLEVEL_OUTOF10: 7
PAINLEVEL_OUTOF10: 0

## 2021-06-24 ASSESSMENT — PAIN DESCRIPTION - PAIN TYPE: TYPE: ACUTE PAIN

## 2021-06-24 ASSESSMENT — PAIN - FUNCTIONAL ASSESSMENT: PAIN_FUNCTIONAL_ASSESSMENT: 0-10

## 2021-06-24 ASSESSMENT — PAIN DESCRIPTION - LOCATION: LOCATION: LEG

## 2021-06-24 NOTE — OP NOTE
Operative Note      Patient: Romain Navarro  YOB: 1970  MRN: [id number]    Date of Procedure: 6/24/2021    Pre-Op Diagnosis: Left bicondylar tibial plateau fracture with retained external fixator    Post-Op Diagnosis: Same         Procedure:  1. Removal of external fixator under anesthesia left lower extremity    2. Open reduction internal fixation left bicondylar tibial plateau fracture with plate and screws        Surgeon(s):  Deven Alexandra MD    Assistant:   Resident: Hannah Shaw DO; Sola Palacio DO; Gerardo Contreras DO    Anesthesia: General    Estimated Blood Loss (mL): Minimal    Complications: None    Specimens:   * No specimens in log *    Implants:  Implant Name Type Inv. Item Serial No.  Lot No. LRB No. Used Action   SCREW BNE L58MM DIA4. 5MM PROX OMI TIB S STL ST KELLY FULL  SCREW BNE L58MM DIA4. 5MM PROX OMI TIB S STL ST KELLY FULL  DEPUY [employer]-WD  Left 1 Implanted   SCREW BNE L85MM DIA5MM S STL ST KELLY FULL THRD T25 STARDRV  SCREW BNE L85MM DIA5MM S STL ST KELLY FULL THRD T25 STARDRV  DEPUY [employer]-WD  Left 2 Implanted   SCREW BNE L90MM DIA5MM S STL ST KELLY FULL THRD T25 STARDRV  SCREW BNE L90MM DIA5MM S STL ST KELLY FULL THRD T25 STARDRV  DEPUY [employer]-WD  Left 2 Implanted   SCREW BNE L75MM DIA5MM S STL ST KELLY FULL THRD T25 STARDRV  SCREW BNE L75MM DIA5MM S STL ST KELLY FULL THRD T25 STARDRV  DEPUY SY[employer]ES USA-WD  Left 1 Implanted   PLATE BNE [id number] 12 H ST L LAT PROX TIB S STL KELLY COMPR LO  PLATE BNE [id number] 12 H ST L LAT PROX TIB S STL KELLY COMPR LO  DEPUY [employer]-  Left 1 Implanted   SCREW BNE L80MM DIA6.5MM THRD L32MM HD DIA8MM CANC S STL ST  SCREW BNE L80MM DIA6.5MM THRD L32MM HD DIA8MM CANC S STL ST  DEPUY SY[employer]-WD  Left 1 Implanted   SCREW BNE L40MM DIA4. 5MM PROX OMI TIB S STL ST KELLY FULL  SCREW BNE L40MM DIA4. 5MM PROX OMI TIB S STL ST KELLY FULL  DEPUY [employer]-WD  Left 1 Implanted         Drains: * No LDAs found *    Findings: Used a Synthes 4.5 mm 10 hole precontoured lateral tibial plate    Detailed Description of Procedure:   Patient was brought to the operating room in a supine position on a hospital bed. Patient was transferred to the operating room table by multiple individuals in a safe fashion with anesthesia in control of the patient's C-spine and airway. Once on the operating table, all points of pressure were identified and well-padded. A tourniquet was applied to the patient's left upper thigh. The patient's left lower extremity was sterilely prepped and draped in the standard orthopedic fashion. A timeout was performed indicating the appropriate identification of the patient, the procedure to be performed, and the side to be performed upon. This was agreed upon by all individuals in the room. The external fixator was prepped and therefore after the timeout we utilized it to help and aid in reduction. Once we had performed this a lateral approach was marked out to the proximal tibia. A 10 blade is used to make an incision. Dissection was carried to the fascia of the anterior compartment which was incised. We also incised the iliotibial band and subperiosteally elevated from Kelsey's tubercle. Once identified the lateral plateau we utilized K wires and clamps and loosened the external fixator to obtain a near-anatomic reduction. We then slid the 10 hole Synthes plate in the position. A bicortical screw was placed in the shaft after checking on AP and lateral views. At this point time a lag screw was then placed into the proximal medial piece to help aid in correct some reduction. After this it occurred locking screws were then placed across the joint. Distally percutaneous bicortical screws were placed obtain adequate fixation. Final x-rays showed overall good reduction of the fracture with hardware in good position. The wounds were eryn irrigated sterile normal saline.   There then closed with 0 Vicryl in the fascia in a watertight fashion subtenons tissue with 2-0 Monocryl and skin with 3-0 nylon's. The external fixator was then fully removed at this point time including the pins. The surgical area was then covered and the pin sites were curetted and cleaned out. The leg had sterile dressings put in position was wrapped from the toes up to the upper thigh. A knee immobilizer was put in position. Patient was taken the PACU in stable condition. Postoperative plan:  1. Strict nonweightbearing left lower extremity    2. DVT prophylaxis in the form of Lovenox from the hospital aspirin as an outpatient    3. Follow in the office in 2 weeks for suture removal x-rays of left knee    4. We will most likely transition to a knee range of motion brace from the hospital be 0-30 for range of motion.       Electronically signed by Leah Lemons MD on 6/24/2021 at 11:53 AM
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
## 2021-06-24 NOTE — PROGRESS NOTES
Covid Test done: 06/18/2021     Results: Not detected    Self-quarantine guidelines followed since tested? Yes    Any unusual S/S or concerns expressed or observed?  No

## 2021-06-24 NOTE — ANESTHESIA PROCEDURE NOTES
Peripheral Block    Patient location during procedure: procedure area  End time: 6/24/2021 9:25 AM  Staffing  Anesthesiologist: Leonel Friedman DO  Preanesthetic Checklist  Completed: patient identified, IV checked, site marked, risks and benefits discussed, surgical consent, monitors and equipment checked, pre-op evaluation, timeout performed, anesthesia consent given, oxygen available and patient being monitored  Peripheral Block  Patient position: supine  Prep: ChloraPrep  Patient monitoring: cardiac monitor, continuous pulse ox, frequent blood pressure checks and IV access  Block type: Sciatic  Laterality: left  Injection technique: single-shot  Guidance: nerve stimulator and ultrasound guided  Local infiltration: ropivacaine  Infiltration strength: 0.25 %  Popliteal  Provider prep: mask and sterile gloves  Local infiltration: ropivacaine  Needle  Needle type:  Other (Stimuplex)   Needle gauge: 20 G  Needle length: 10 cm  Needle localization: nerve stimulator and ultrasound guidance  Assessment  Injection assessment: negative aspiration for heme, no paresthesia on injection and local visualized surrounding nerve on ultrasound  Slow fractionated injection: yes  Hemodynamics: stable  Reason for block: post-op pain management and at surgeon's request

## 2021-06-24 NOTE — ANESTHESIA PRE PROCEDURE
Department of Anesthesiology  Preprocedure Note       Name:  Tabitha Peng   Age:  53 y.o.  :  1970                                          MRN:  [id number]         Date:  2021      Surgeon: Marzena Whittaker):  Diamante Flores MD    Procedure: Procedure(s):  REMOVAL OF LEFT LOWER EXTREMITY EXTERNAL FIXATOR,  OPEN REDUCTION INTERNAL FIXATION LEFT TIBIAL PLATEAU-  SYNTHES / GEORGE    Medications prior to admission:   Prior to Admission medications    Medication Sig Start Date End Date Taking? Authorizing Provider   sulfamethoxazole-trimethoprim (BACTRIM DS) 800-160 MG per tablet Take 1 tablet by mouth 2 times daily 21   Carmen Arizmendi PA-C       Current medications:    Current Facility-Administered Medications   Medication Dose Route Frequency Provider Last Rate Last Admin    0.9 % sodium chloride infusion  25 mL Intravenous PRN Carmen Arizmendi PA-C        ceFAZolin (ANCEF) 3,000 mg in dextrose 5 % 100 mL IVPB  3,000 mg Intravenous On Call to 23 Kiki Curtis PA-C        sodium chloride flush 0.9 % injection 10 mL  10 mL Intravenous 2 times per day Carmen Arizmendi PA-C        sodium chloride flush 0.9 % injection 10 mL  10 mL Intravenous PRN Carmen Arizmendi PA-C           Allergies:  No Known Allergies    Problem List:    Patient Active Problem List   Diagnosis Code    Tibial plateau fracture, left, closed, initial encounter O13.750L       Past Medical History:  History reviewed. No pertinent past medical history. Past Surgical History:        Procedure Laterality Date    LEG SURGERY Left 2021    LEG EXTERNAL FIXATOR APPLICATION, LEFT performed by Sandie Valdez MD at [address]       Social History:    Social History     Tobacco Use    Smoking status: Never Smoker    Smokeless tobacco: Never Used   Substance Use Topics    Alcohol use:  Yes     Alcohol/week: 12.0 standard drinks     Types: 12 Cans of beer per week Comment: 12/week                                Counseling given: Not Answered      Vital Signs (Current):   Vitals:    06/18/21 1432 06/24/21 0755   BP:  (!) 142/96   Pulse:  92   Resp:  20   Temp:  36.1 °C (96.9 °F)   TempSrc:  Temporal   SpO2:  99%   Weight: 280 lb (127 kg) 280 lb (127 kg)   Height: 6' 7\" (2.007 m) 6' 7\" (2.007 m)                                              BP Readings from Last 3 Encounters:   06/24/21 (!) 142/96   06/08/21 (!) 145/96   06/06/21 (!) 127/91       NPO Status: Time of last liquid consumption: 2000                        Time of last solid consumption: 1900                        Date of last liquid consumption: 06/23/21                        Date of last solid food consumption: 06/16/21    BMI:   Wt Readings from Last 3 Encounters:   06/24/21 280 lb (127 kg)   06/05/21 280 lb (127 kg)     Body mass index is 31.54 kg/m². CBC:   Lab Results   Component Value Date    WBC 17.3 06/07/2021    RBC 4.39 06/07/2021    HGB 13.2 06/07/2021    HCT 40.0 06/07/2021    MCV 91.1 06/07/2021    RDW 12.2 06/07/2021     06/07/2021       CMP:   Lab Results   Component Value Date     06/07/2021    K 4.2 06/07/2021     06/07/2021    CO2 24 06/07/2021    BUN 12 06/07/2021    CREATININE 0.9 06/07/2021    GFRAA >60 06/07/2021    LABGLOM >60 06/07/2021    GLUCOSE 151 06/07/2021    CALCIUM 8.5 06/07/2021       POC Tests: No results for input(s): POCGLU, POCNA, POCK, POCCL, POCBUN, POCHEMO, POCHCT in the last 72 hours.     Coags:   Lab Results   Component Value Date    PROTIME 12.4 06/06/2021    INR 1.1 06/06/2021       HCG (If Applicable): No results found for: PREGTESTUR, PREGSERUM, HCG, HCGQUANT     ABGs: No results found for: PHART, PO2ART, KBS1YQB, KYY5BMT, BEART, I9ZAEWWS     Type & Screen (If Applicable):  No results found for: LABABO, LABRH    Drug/Infectious Status (If Applicable):  No results found for: HIV, HEPCAB    COVID-19 Screening (If Applicable):   Lab Results Component Value Date    COVID19 Not Detected 06/18/2021           Anesthesia Evaluation  Patient summary reviewed and Nursing notes reviewed no history of anesthetic complications:   Airway: Mallampati: II  TM distance: >3 FB   Neck ROM: full  Mouth opening: > = 3 FB Dental:      Comment: Poor dentition. Multiple chipped teeth. Patient denies loose. Pulmonary: breath sounds clear to auscultation      Smoker: chews tobacco.                           Cardiovascular:  Exercise tolerance: good (>4 METS),           Rhythm: regular  Rate: normal           Beta Blocker:  Not on Beta Blocker         Neuro/Psych:   Negative Neuro/Psych ROS              GI/Hepatic/Renal: Neg GI/Hepatic/Renal ROS            Endo/Other:                      ROS comment: Left TP fx Abdominal:           Vascular: negative vascular ROS. Anesthesia Plan      general and regional     ASA 3     (R & B of Lt. Femoral and popliteal fossa nerve blocks discussed. Questions answered. Pt. Is agreeable to blocks for postop pain.)  Induction: intravenous. MIPS: Postoperative opioids intended, Prophylactic antiemetics administered and Postoperative trial extubation. Anesthetic plan and risks discussed with patient. Use of blood products discussed with patient whom consented to blood products. Plan discussed with CRNA and attending. Grace Jules RN   6/24/2021 9508    Patient seen and examined, chart reviewed, agree with above findings. Anesthetic plan, risks, benefits, alternatives, and personnel involved discussed with patient and his mother. Patient verbalized an understanding and agreed to proceed. NPO status confirmed. Anesthetic plan discussed with care team members and agreed upon.     Roge Salazar DO   6/24/2021  8:57 AM

## 2021-06-24 NOTE — ANESTHESIA POSTPROCEDURE EVALUATION
Department of Anesthesiology  Postprocedure Note    Patient: Josephine Calvo  MRN: [id number]  YOB: 1970  Date of evaluation: 6/24/2021  Time:  4:24 PM     Procedure Summary     Date: 06/24/21 Room / Location: [facility] OR 08 / [facility]    Anesthesia Start: 5516 Anesthesia Stop: 1305    Procedure: REMOVAL OF LEFT LOWER EXTREMITY EXTERNAL FIXATOR,  OPEN REDUCTION INTERNAL FIXATION LEFT TIBIAL PLATEAU-  Sharlot Perks / Oscar Delta (Left ) Diagnosis: (LEFT TP FX)    Surgeons: Talia Soler MD Responsible Provider: Haroldo Alva DO    Anesthesia Type: general, regional ASA Status: 3          Anesthesia Type: general, regional    Ayanna Phase I: Ayanna Score: 10    Ayanna Phase II:      Last vitals: Reviewed and per EMR flowsheets.        Anesthesia Post Evaluation    Patient location during evaluation: PACU  Patient participation: complete - patient participated  Level of consciousness: awake and alert  Pain score: 1  Airway patency: patent  Nausea & Vomiting: no nausea and no vomiting  Complications: no  Cardiovascular status: hemodynamically stable  Respiratory status: acceptable  Hydration status: euvolemic

## 2021-06-24 NOTE — ANESTHESIA PROCEDURE NOTES
Peripheral Block    Patient location during procedure: procedure area  End time: 6/24/2021 9:35 AM  Staffing  Anesthesiologist: Sheryle Lehmann, DO  Preanesthetic Checklist  Completed: patient identified, IV checked, site marked, risks and benefits discussed, surgical consent, monitors and equipment checked, pre-op evaluation, timeout performed, anesthesia consent given, oxygen available and patient being monitored  Peripheral Block  Patient position: supine  Prep: ChloraPrep  Patient monitoring: cardiac monitor, continuous pulse ox, frequent blood pressure checks and IV access  Block type: Femoral  Laterality: left  Injection technique: single-shot  Guidance: nerve stimulator and ultrasound guided  Local infiltration: ropivacaine  Infiltration strength: 0.25 %  Dose: 30 mL  Provider prep: mask and sterile gloves  Local infiltration: ropivacaine  Needle  Needle type:  Other (Stimuplex)   Needle gauge: 20 G  Needle length: 10 cm  Needle localization: nerve stimulator and ultrasound guidance  Assessment  Injection assessment: negative aspiration for heme, no paresthesia on injection and local visualized surrounding nerve on ultrasound  Slow fractionated injection: yes  Hemodynamics: stable  Reason for block: post-op pain management and at surgeon's request

## 2021-06-24 NOTE — PROGRESS NOTES
Patient alert/oriented , no distress , breakthrough drainage to left lower extremity noted on ace wrap, brace maintained.  Dee Dee Tamayo RN

## 2021-06-25 VITALS
SYSTOLIC BLOOD PRESSURE: 138 MMHG | BODY MASS INDEX: 32.4 KG/M2 | DIASTOLIC BLOOD PRESSURE: 86 MMHG | OXYGEN SATURATION: 98 % | WEIGHT: 280 LBS | HEART RATE: 86 BPM | RESPIRATION RATE: 17 BRPM | HEIGHT: 78 IN | TEMPERATURE: 98 F

## 2021-06-25 DIAGNOSIS — S82.142A CLOSED BICONDYLAR FRACTURE OF LEFT TIBIAL PLATEAU: Primary | ICD-10-CM

## 2021-06-25 LAB
ANION GAP SERPL CALCULATED.3IONS-SCNC: 10 MMOL/L (ref 7–16)
BUN BLDV-MCNC: 12 MG/DL (ref 6–20)
CALCIUM SERPL-MCNC: 8.6 MG/DL (ref 8.6–10.2)
CHLORIDE BLD-SCNC: 102 MMOL/L (ref 98–107)
CO2: 26 MMOL/L (ref 22–29)
CREAT SERPL-MCNC: 0.9 MG/DL (ref 0.7–1.2)
GFR AFRICAN AMERICAN: >60
GFR NON-AFRICAN AMERICAN: >60 ML/MIN/1.73
GLUCOSE BLD-MCNC: 139 MG/DL (ref 74–99)
POTASSIUM REFLEX MAGNESIUM: 3.9 MMOL/L (ref 3.5–5)
SODIUM BLD-SCNC: 138 MMOL/L (ref 132–146)

## 2021-06-25 PROCEDURE — 6370000000 HC RX 637 (ALT 250 FOR IP): Performed by: PHYSICAL THERAPY ASSISTANT

## 2021-06-25 PROCEDURE — 96374 THER/PROPH/DIAG INJ IV PUSH: CPT

## 2021-06-25 PROCEDURE — 6360000002 HC RX W HCPCS: Performed by: STUDENT IN AN ORGANIZED HEALTH CARE EDUCATION/TRAINING PROGRAM

## 2021-06-25 PROCEDURE — G0378 HOSPITAL OBSERVATION PER HR: HCPCS

## 2021-06-25 PROCEDURE — 97165 OT EVAL LOW COMPLEX 30 MIN: CPT

## 2021-06-25 PROCEDURE — 6370000000 HC RX 637 (ALT 250 FOR IP): Performed by: STUDENT IN AN ORGANIZED HEALTH CARE EDUCATION/TRAINING PROGRAM

## 2021-06-25 PROCEDURE — 2580000003 HC RX 258: Performed by: STUDENT IN AN ORGANIZED HEALTH CARE EDUCATION/TRAINING PROGRAM

## 2021-06-25 PROCEDURE — L1832 KO ADJ JNT POS R SUP PRE CST: HCPCS

## 2021-06-25 PROCEDURE — 36415 COLL VENOUS BLD VENIPUNCTURE: CPT

## 2021-06-25 PROCEDURE — 80048 BASIC METABOLIC PNL TOTAL CA: CPT

## 2021-06-25 RX ADMIN — SULFAMETHOXAZOLE AND TRIMETHOPRIM 1 TABLET: 800; 160 TABLET ORAL at 10:24

## 2021-06-25 RX ADMIN — ASPIRIN 81 MG: 81 TABLET, COATED ORAL at 10:24

## 2021-06-25 RX ADMIN — OXYCODONE HYDROCHLORIDE 10 MG: 10 TABLET ORAL at 00:36

## 2021-06-25 RX ADMIN — ACETAMINOPHEN 650 MG: 325 TABLET ORAL at 03:04

## 2021-06-25 RX ADMIN — OXYCODONE HYDROCHLORIDE 10 MG: 10 TABLET ORAL at 15:20

## 2021-06-25 RX ADMIN — ACETAMINOPHEN 650 MG: 325 TABLET ORAL at 15:20

## 2021-06-25 RX ADMIN — Medication 2000 MG: at 03:05

## 2021-06-25 RX ADMIN — OXYCODONE HYDROCHLORIDE 10 MG: 10 TABLET ORAL at 10:23

## 2021-06-25 RX ADMIN — ACETAMINOPHEN 650 MG: 325 TABLET ORAL at 10:22

## 2021-06-25 RX ADMIN — OXYCODONE HYDROCHLORIDE 10 MG: 10 TABLET ORAL at 05:48

## 2021-06-25 RX ADMIN — BISACODYL 5 MG: 5 TABLET, COATED ORAL at 10:23

## 2021-06-25 RX ADMIN — SODIUM CHLORIDE: 9 INJECTION, SOLUTION INTRAVENOUS at 03:10

## 2021-06-25 ASSESSMENT — PAIN DESCRIPTION - ONSET
ONSET: GRADUAL

## 2021-06-25 ASSESSMENT — PAIN DESCRIPTION - DESCRIPTORS
DESCRIPTORS: ACHING;DISCOMFORT;SORE
DESCRIPTORS: ACHING;DISCOMFORT;SORE
DESCRIPTORS: ACHING;DISCOMFORT;DULL
DESCRIPTORS: ACHING;DISCOMFORT;SORE

## 2021-06-25 ASSESSMENT — PAIN DESCRIPTION - PAIN TYPE
TYPE: SURGICAL PAIN
TYPE: ACUTE PAIN;SURGICAL PAIN
TYPE: SURGICAL PAIN

## 2021-06-25 ASSESSMENT — PAIN DESCRIPTION - PROGRESSION
CLINICAL_PROGRESSION: NOT CHANGED
CLINICAL_PROGRESSION: NOT CHANGED
CLINICAL_PROGRESSION: GRADUALLY WORSENING
CLINICAL_PROGRESSION: NOT CHANGED

## 2021-06-25 ASSESSMENT — PAIN DESCRIPTION - FREQUENCY
FREQUENCY: INTERMITTENT

## 2021-06-25 ASSESSMENT — PAIN - FUNCTIONAL ASSESSMENT
PAIN_FUNCTIONAL_ASSESSMENT: PREVENTS OR INTERFERES SOME ACTIVE ACTIVITIES AND ADLS

## 2021-06-25 ASSESSMENT — PAIN SCALES - GENERAL
PAINLEVEL_OUTOF10: 7
PAINLEVEL_OUTOF10: 3
PAINLEVEL_OUTOF10: 7
PAINLEVEL_OUTOF10: 3
PAINLEVEL_OUTOF10: 7
PAINLEVEL_OUTOF10: 3
PAINLEVEL_OUTOF10: 0
PAINLEVEL_OUTOF10: 3
PAINLEVEL_OUTOF10: 7

## 2021-06-25 ASSESSMENT — PAIN DESCRIPTION - LOCATION
LOCATION: LEG

## 2021-06-25 ASSESSMENT — PAIN DESCRIPTION - ORIENTATION
ORIENTATION: LEFT
ORIENTATION: LEFT

## 2021-06-25 NOTE — PLAN OF CARE
Problem: Falls - Risk of:  Goal: Will remain free from falls  Description: Will remain free from falls  Outcome: Completed  Goal: Absence of physical injury  Description: Absence of physical injury  Outcome: Completed     Problem: Musculor/Skeletal Functional Status  Goal: Highest potential functional level  Outcome: Completed  Goal: Absence of falls  Outcome: Completed     Problem: Skin Integrity:  Goal: Will show no infection signs and symptoms  Description: Will show no infection signs and symptoms  Outcome: Completed  Goal: Absence of new skin breakdown  Description: Absence of new skin breakdown  Outcome: Completed

## 2021-06-25 NOTE — PROGRESS NOTES
Physical Therapy  Physical Therapy Initial Assessment       Name: Bernette Dakins  : 1970  MRN: [id number]      Date of Service: 2021    Evaluating PT:  Harley Rivas PT, DPT  [id number]    Room #:  2689/9887-K  Diagnosis:  Tibial plateau fracture, left, closed, initial encounter [M64.602F]    PMHx/PSHx:    Procedure/Surgery:  REMOVAL OF LEFT LOWER EXTREMITY EXTERNAL FIXATOR,  OPEN REDUCTION INTERNAL FIXATION LEFT TIBIAL PLATEAU  Precautions:  NWB LLE, Knee Immobilizer   Equipment Needs:  Owns all DME from previous surgery    SUBJECTIVE:  Pt lives alone in a 3 story home with 3 stairs to enter and 2 rail +1 [place] 0 rail. Bed is on first floor and bath is on first floor. Pt ambulated with no AD independently PTA. Pt reports he has potential assistance from numerous friends and family. Reports stairs are platform   Equipment Owned:     OBJECTIVE:   Initial Evaluation  Date: 21 Treatment Short Term/ Long Term   Goals   AM-PAC 6 Clicks      Was pt agreeable to Eval/treatment? Yes     Does pt have pain? Mild pain LLE     Bed Mobility  Rolling: SBA  Supine to sit: SBA  Sit to supine: SBA  Scooting: SBA  Rolling: Independent  Supine to sit: Independent  Sit to supine: Independent  Scooting: Independent     Transfers Sit to stand: SBA  Stand to sit: SBA  Stand pivot: SBA [employer]  Sit to stand: Modified Independent    Stand to sit: Modified Independent    Stand pivot: Modified Independent     Ambulation    50 feet with SBA Taran [employer]   >150 feet with Modified Independent     Stair negotiation: ascended and descended  Declined- reports he did previously, states he has no concerns.   >4 steps with single rail Modified Independent     ROM BUE:  Defer to OT  BLE:  WFL     Strength BUE:  Defer to OT  LLE: 3/5 hip   4/5 ankle  RLE: 5/5  Improve strength 1 MMT   Balance Sitting EOB:  SBA  Dynamic Standing:  SBA  Sitting EOB:  Independent    Dynamic Standing:  Modified Independent       Pt is A & O x 4  Sensation:  WNL  Edema:  WNL      Patient education  Pt educated on role of PT    Patient response to education:   Pt verbalized understanding Pt demonstrated skill Pt requires further education in this area   x x x     ASSESSMENT:    Conditions Requiring Skilled Therapeutic Intervention:    []Decreased strength     []Decreased ROM  [x]Decreased functional mobility  []Decreased balance   []Decreased endurance   []Decreased posture  []Decreased sensation  []Decreased coordination   []Decreased vision  []Decreased safety awareness   [x]Increased pain       Comments:  Pt received in supine agreeable to PT evaluation. Pt performing all mobility without physical assist. Pt ambulates with steady gait, abiding by NWB precaution entire session. Declined to trial stairs, states he performed previously when he had ex fix on. States he has no concerns. Patient would benefit from continued skilled PT to maximize functional mobility independence. Treatment:  Patient practiced and was instructed in the following treatment:     Functional transfers-Verbal cues for proper positioning and sequencing to perform transfers safely with maximum independence.  Gait training-Verbal cues for proper positioning and sequencing using assistive device to maximize functional mobility independence. Pt's/ family goals   1. Go home    Prognosis is good for reaching above PT goals. Patient and or family understand(s) diagnosis, prognosis, and plan of care.   yes    PHYSICAL THERAPY PLAN OF CARE:    PT POC is established based on physician order and patient diagnosis     Referring provider/PT Order:    06/24/21 1430  PT eval and treat Start: 06/24/21 1430, End: 06/24/21 1430, ONE TIME, Standing Count: 1 Occurrences, R      Adiel Patches, DO       Diagnosis:  Tibial plateau fracture, left, closed, initial encounter [S84.407A]  Specific instructions for next treatment:  Increase ambulation distance    Current Treatment

## 2021-06-25 NOTE — PROGRESS NOTES
Occupational Therapy  OCCUPATIONAL THERAPY INITIAL EVALUATION     [address] [address] [facility]  [address] [facility], [address] [place] [address]                                                Patient Name: Ray Shaw  MRN: [id number]  : 1970  Room: [address] [place] [address]    Evaluating OT: Becky Alvarenga OTR/L #[id number]     Referring Provider: Capri Segura DO  Specific Provider Orders/Date: OT eval and treat 21    Diagnosis: Tibial Plateau fx   Pt admitted to hospital with [facility] and ex-fix placement 21. Pt presented back to hospital for scheduled surgery. Surgery / Procedure: 21  1. Removal of external fixator under anesthesia left lower extremity  2. Open reduction internal fixation left bicondylar tibial plateau fracture with plate and screws     Pertinent Medical History:     History reviewed. No pertinent past medical history. Precautions:  NWB L LE, L knee immobilizer     Assessment of current deficits (evaluation only)   [] Functional mobility  []ADLs  [] Strength               []Cognition    [] Functional transfers   [] IADLs         [] Safety Awareness   []Endurance    [] Fine Coordination              [] Balance      [] Vision/perception   []Sensation     []Gross Motor Coordination  [] ROM  [] Delirium                   [] Motor Control     OT PLAN OF CARE   OT POC based on physician orders, patient diagnosis and results of clinical assessment    Frequency/Duration 1 days (evaluation only)  Specific OT Treatment Interventions to include:   Evaluation only - d/c    Recommended Adaptive Equipment:  none    Home Living: Pt lives with alone in a [facility] 1 1/2 story home with 3+1 steps and B  hand rails. Bed and bath on main floor floor.   Bathroom setup: tub shower and elevated commode   Equipment owned: w/w, BSC, shower chair     Prior Level of Function: Independent with ADLs , Independent with IADLs; ambulated no AD   Driving: yes  Occupation:   Medication management: self  Leisure: enjoys long-term riding    Pain Level: Pt reports 2-3/10 L LE pain this session; Pt educated on NWB status and nursing notified of pain medication request.     Cognition: oriented x 4  Additional Comments: Pleasant and cooperative. Followed commands     Sensory:   Hearing: wfl  Vision: wfl     Glasses: yes [] no [] reading []      UE Assessment:  Hand Dominance: Right [x]  Left []     Strength ROM Additional Info:    RUE  5/5 wfl good  and wfl FMC/dexterity noted during ADL tasks     LUE 5/5 wfl good  and wfl FMC/dexterity noted during ADL tasks   Sensation: wfl  Tone: wfl  Edema: none noted    Functional Assessment:   Current Status  Comments   Feeding  indep    Grooming  indep    Upper Body Dressing indep     Lower Body Dressing Mod I    Bathing Mod I    Toileting  Mod I    Bed Mobility  Supine to Sit: mod I  Sit to Supine: NT    Functional Transfers Mod I    Functional Mobility Mod I Ambulated in room with w/w while maintaining NWB status     Sit balance: indep  Stand balance: indep  Endurance/Activity tolerance: G                              Comments: Upon arrival pt supine in bed and agreeable to OT Session . At end of session pt seated in chair with all devices within reach, all lines intact.       Eval Complexity: low    (Evaluation time includes thorough review of current medical information, gathering information on past medical history/social history and prior level of function, completion of standardized testing/informal observation of tasks, assessment of data, and development of POC/Goals.)    Treatment frequency: evaluation only     Rehab Potential: Good    Patient / Family Goal: Return home    Short term goals  Time Frame: Evaluation only - Skilled OT services not indicated    Patient and/or family understands diagnosis, prognosis and plan of care: yes    [] Malnutrition indicators have been identified and nursing has been notified to ensure a dietitian consult is ordered.      Time in: 1000  Time out: 1015  Total Time: 15 minutes    Nacho Silverman OTR/L #[id number]

## 2021-06-25 NOTE — PROGRESS NOTES
Department of Orthopedic Surgery  Resident Progress Note    POD 1. Patient seen and examined. Pain controlled. No new complaints. Denies chest pain, shortness of breath, dizziness/lightheadedness. VITALS:  /73   Pulse 90   Temp 98.4 °F (36.9 °C) (Temporal)   Resp 18   Ht 6' 7\" (2.007 m)   Wt 280 lb (127 kg)   SpO2 96%   BMI 31.54 kg/m²     General: alert and oriented to person, place and time, well-developed and well-nourished, in no acute distress    MUSCULOSKELETAL:   left lower extremity:  · Dressing C/D/I, no strike through appreciated  · Compartments soft and compressible  · Calf is soft and nontender  · +PF/DF/EHL  · +2/4 DP & PT pulses, Brisk Cap refill, Toes warm and perfused  · Distal sensation grossly intact to Peroneals, Sural, Saphenous, and tibial nrs    CBC:   Lab Results   Component Value Date    WBC 9.2 06/24/2021    HGB 14.3 06/24/2021    HCT 42.6 06/24/2021     06/24/2021     PT/INR:    Lab Results   Component Value Date    PROTIME 12.4 06/06/2021    INR 1.1 06/06/2021       ASSESSMENT  · S/P L tibial plateau ORIF - 1/39/80    PLAN      · Continue physical therapy and protocol: NWB - L LE  · 24 hour abx coverage to be completed today  · Deep venous thrombosis prophylaxis - ASA, early mobilization  · PT/OT  · Pain Control: IV and PO  · Monitor H&H, pending, asymptomatic  · D/C Plan:  home today. Ok to DC once hinged ROM brace in place    Electronically signed by Lu Stone DO on 6/25/2021 at 7:56 AM       Doing well. Strict nonweightbearing. Okay to discharge home. We will see back in 2 weeks in the office. Call with any questions.

## 2021-06-25 NOTE — PROGRESS NOTES
CLINICAL PHARMACY NOTE: MEDS TO BEDS    Total # of Prescriptions Filled: 3   The following medications were delivered to the patient:  · Bactrim 800-160mg  · Percocet 5-325mg  · Aspirin 81mg    Additional Documentation:    Delivered to patient 6/25

## 2021-06-25 NOTE — CARE COORDINATION
6/25/2021 social work transition of care planning  Sw followed up with pt at bedside. No changes since last admission. Pt plan home, no needs. Family to transport at discharge.   Electronically signed by ESME Kumar on 6/25/2021 at 11:08 AM

## 2021-06-28 ENCOUNTER — TELEPHONE (OUTPATIENT)
Dept: ADMINISTRATIVE | Age: 51
End: 2021-06-28

## 2021-06-28 NOTE — TELEPHONE ENCOUNTER
Call placed to pt, notified to keep 2wk post-op appt. He verbalized understanding, questions answered.

## 2021-07-13 ENCOUNTER — OFFICE VISIT (OUTPATIENT)
Dept: ORTHOPEDIC SURGERY | Age: 51
End: 2021-07-13
Payer: MEDICARE

## 2021-07-13 ENCOUNTER — HOSPITAL ENCOUNTER (OUTPATIENT)
Dept: GENERAL RADIOLOGY | Age: 51
Discharge: HOME | End: 2021-07-15
Payer: MEDICARE

## 2021-07-13 VITALS — TEMPERATURE: 98.3 F

## 2021-07-13 DIAGNOSIS — S82.142D CLOSED FRACTURE OF LEFT TIBIAL PLATEAU WITH ROUTINE HEALING, SUBSEQUENT ENCOUNTER: Primary | ICD-10-CM

## 2021-07-13 DIAGNOSIS — S82.142A CLOSED BICONDYLAR FRACTURE OF LEFT TIBIAL PLATEAU: ICD-10-CM

## 2021-07-13 PROCEDURE — 73560 X-RAY EXAM OF KNEE 1 OR 2: CPT

## 2021-07-13 PROCEDURE — 99024 POSTOP FOLLOW-UP VISIT: CPT | Performed by: PHYSICIAN ASSISTANT

## 2021-07-13 PROCEDURE — 99212 OFFICE O/P EST SF 10 MIN: CPT | Performed by: PHYSICIAN ASSISTANT

## 2021-07-13 RX ORDER — ZINC SULFATE 50(220)MG
50 CAPSULE ORAL DAILY
Qty: 30 CAPSULE | Refills: 2 | Status: SHIPPED | OUTPATIENT
Start: 2021-07-13

## 2021-07-13 RX ORDER — OYSTER SHELL CALCIUM WITH VITAMIN D 500; 200 MG/1; [IU]/1
1 TABLET, FILM COATED ORAL 2 TIMES DAILY
Qty: 60 TABLET | Refills: 2 | Status: SHIPPED | OUTPATIENT
Start: 2021-07-13

## 2021-07-13 NOTE — PROGRESS NOTES
Gina Carpenter is a 49 y.o. male who presents for follow up of left tibia ORIF with EX fix    SURGEON: Dr. Lonnie Knight MD  Date of Injury/Surgery: 6-24-21 June 5th initial injury. Ex fix applied 6-7-21 and removed the 24th  Date last seen in office: 6-25-21    Symptoms: better  New complaints: none    Cast/Splint, Brace, or Dressings:  Brace being worn at all times  Weightbearing:  Non-weight bearing      Assistive device Walker - standard and crutches PRN  Participating in therapy (location if yes)?  no    Refills Needed: None  Order/Referral Needed: N/A

## 2021-07-13 NOTE — PATIENT INSTRUCTIONS
Remove sutures  Steri strips should fall off in the shower at some point, if they do not fall off in 10 days, remove them  Dressing: Can remove dressing in 1-2 days then open to air  Can shower in a couple days, NO Soaking or submerging incision in water until fully healed & all scabs are gone    WB:  Non-weight bearing on left lower extremity    Post operative you were fit with a Hinged Range Of Motion Knee Brace. This is set 0-40 degrees in office today, Advance by 10 degrees flexion every week. This brace should be on at all times with the following exceptions: daily skin checks for irritation or breakdown, hygiene, wound care, therapy, to apply/adjust ACE wraps and to apply ICE to the area of injury. The brace is adjustable with velcro straps for your comfort. You will be provided additional instructions on brace wear and adjustment of motion limitations at your initial post operative period. Please contact Ortho Trauma Office if having skin irritation/breakdown from brace. DVT: Continue with ASA 81 mg BID as ordered      Continue with ice to the injured extremity 2-3 times per day for swelling  If able continue with elevation and compression    Follow up in 4 weeks with XR of the left knee            Incision and Scar Care  Change any dressings as needed throughout the day if becoming saturated with drainage. Can remove dressings to shower, then thoroughly pat dry with clean towel and re-apply dressings as directed by your medical provider. No scrubbing over the incision line or submerging the incision line until skin is fully healed. Can remove Steri Strips after 7-10 days if they do not fall off on their own sooner. Once your incision has fully healed and no there is no drainage, swelling, redness, or red streaking, you can begin scar massage. Use Vaseline or lotion to perform scar massage several times per day. Massage your incision line to break up any fibrous adhesions and scar tissue.  Only massage areas of skin that have fully healed. Do not pull your scabs off. Let them fall off on their own. How much pressure should I apply? You should apply as much pressure as you can tolerate. Begin with light pressure and progress to deeper and  firmer pressure. Massage lotion in, applying enough pressure to make the scar area lighten in color or turn  white. How often should I massage my scar? Massage should be done two to three times daily for ten minutes each time. How long is massaging necessary? You should massage your scars as instructed for at least six months following your surgery or injury. Massaging for more than six months will not hurt your scars and may actually prove beneficial.  When should I stop massaging? Stop massaging and contact your doctor if you experience any of the following:   Redness   Bleeding   Scar feels warmer than the skin around it   More pain than usual at the site of the scar    Long Term Scar Management    Apply sunscreen and clothing that covers your scar if it is in direct sunlight. This includes going outside or in a car. Sun protection prevents darkening of the scar. Scars take several months to years to completely heal and remodel. What are silicone gel strips/silicone sheeting? These strips are thin, pliable, and 691% silicone to occlude the scar, which helps the body to maintain moisture during the healing process and regulate collagen production, therefore, minimizing scarring. Whenever our skin integrity is altered, whether by incision or injury, the body uses its natural defenses by producing collagen to heal the wound. Without proper care, these areas can overproduce collagen leading to hypertrophy (raised or bumpy) or keloid formation. Why use silicone gel strips after surgery? Studies have shown a reduction in keloid (thick, raised scars) with silicone sheets. Anecdotally, many surgeons feel sheeting helps normal scars heal better.   How do the silicone strips get used? You will receive a pack of sheeting specific for your scar pattern that contains two to four 1mm thick strips. Each strip can cover any scar from 1-6 inches in length but can be cut for you specifically. Each strip has an easy-peel backing and is flexible, durable, and reusable for up to 3 months. Directions for application:  1. Once your surgical incisions have healed and no longer have scabbing or open areas, you can start using your gel strips. 2. Remove gel sheeting from packaging and trim, if necessary, to fit. Sheet should extend at least ½ inch beyond the area. 3. Make sure your scar is clean and dry before application, free from any creams, gels, ointments, or powders. 4. Peel off backing and apply to scar, sticky side down. 5. Wear daily for 12 to 22 hours for approximately 3 months. 6. If gel sheeting loses its stickiness, wash gently with a mild soap and cool water, lay flat to air dry. This will allow it to become sticky again. 7. It is important not to apply sheeting if you plan on swimming, sweating, or bathing. Remove the strips before working out and showering. How long do you wear the sheets? We recommend wearing the sheets for 12 to 22 hours, making sure you remove them daily to allow the area to be washed and cleaned. Most people wear them for about 3 months. When should you stop wearing the sheeting? We recommend wearing the sheets for about 3 months or when advised by one of our team members that it is ok to stop. If you encounter any redness or irritation after starting the silicone or have a known allergy, please contact us immediately. Furthermore, if you feel any areas along your incision that may contain a suture, please stop using the silicone sheeting and contact us.

## 2021-07-14 NOTE — PROGRESS NOTES
Chief Complaint   Patient presents with    Follow-up     Left tibia ORIF with  Ex fix        OP:SURGEON: Dr. Artur Churchill MD  DATE OF PROCEDURE: 6/24/21  PROCEDURE:1. Removal of external fixator under anesthesia left lower extremity  2. Open reduction internal fixation left bicondylar tibial plateau fracture with plate and screws    POD: 3 weeks    Subjective:  Terrell Tamez is following up from the above surgery. He is NWB on left lower extremity and has maintained knee ROM brace. He ambulates with assistive device, crutches, requesting that he use crutches. Pain to extremity is controlled and is not taking prescribed pain medication. They denies numbness, tingling, weakness to the left lower extremity. Denies calf pain, chest pain, or shortness of breath. Patient continues to use DVT prophylaxis, ASA 81 mg BID. Patient is not participating in therapy. Continues to use chew but has decreased use. Presents today initial post op appoiuntment. Review of Systems -  All pertinent positives/negative in HPI     Objective:    General: Alert and oriented X 3, normocephalic atraumatic, external ears and eye normal, sclera clear, no acute distress, respirations easy and unlabored with no audible wheezes, skin warm and dry, speech and dress appropriate for noted age, affect euthymic. Extremity:  Left Lower Extremity  Skin clean dry and intact, without signs of infection   Pin sites healing appropriately, area that rubbing on brace with some serous drainage  Incision healing well, no significant drainage, no dehiscence. Sutures intact and ready for removal Eschar noted to the distal tibia incision  mild edema noted  Compartments supple throughout thigh and leg  Calf supple and not tender  negative Homans  Demonstrates active ankle DF/PF + EHL  States sensation intact to touch in sural, deep peroneal, superficial peroneal, saphenous, posterior tibial  nerve distributions to foot/ankle.   Palpable dorsalis pedis and posterior tibialis pulses, cap refill brisk in toes, foot warm/perfused. Temp 98.3 °F (36.8 °C) (Oral)     XR:   AP/Lateral views of the left knee demonstrate stable alignment of complex comminuted proximal tibia fracture status post internal fixation with laterally placed internal fixating plate and screws. Stable position of comminuted fracture involving proximal fibula. Hardware appears intact without failure or loosening      Assessment:   Diagnosis Orders   1. Closed fracture of left tibial plateau with routine healing, subsequent encounter         Plan:  Remove sutures  Steri strips should fall off in the shower at some point, if they do not fall off in 10 days, remove them  Dressing: Can remove dressing in 1-2 days then open to air  Can shower in a couple days, NO Soaking or submerging incision in water until fully healed & all scabs are gone    WB:  Non-weight bearing on left lower extremity    Post operative you were fit with a Hinged Range Of Motion Knee Brace. This is set 0-40 degrees in office today, Advance by 10 degrees flexion every week. This brace should be on at all times with the following exceptions: daily skin checks for irritation or breakdown, hygiene, wound care, therapy, to apply/adjust ACE wraps and to apply ICE to the area of injury. The brace is adjustable with velcro straps for your comfort. You will be provided additional instructions on brace wear and adjustment of motion limitations at your initial post operative period. Please contact Ortho Trauma Office if having skin irritation/breakdown from brace.     HEP program given today  Instructed on advancement of brace ROM     DVT: Continue with ASA 81 mg BID as ordered      Continue with ice to the injured extremity 2-3 times per day for swelling  If able continue with elevation and compression    Follow up in 4 weeks with XR of the left knee     Electronically signed by Dequan Clayton PA-C on 7/13/2021 at 8:36 PM  Note: This report was completed using [employer] voiced recognition software. Every effort has been made to ensure accuracy; however, inadvertent computerized transcription errors may be present.

## 2021-08-06 DIAGNOSIS — S82.142D CLOSED FRACTURE OF LEFT TIBIAL PLATEAU WITH ROUTINE HEALING, SUBSEQUENT ENCOUNTER: Primary | ICD-10-CM

## 2021-08-09 ENCOUNTER — OFFICE VISIT (OUTPATIENT)
Dept: ORTHOPEDIC SURGERY | Age: 51
End: 2021-08-09
Payer: MEDICARE

## 2021-08-09 ENCOUNTER — HOSPITAL ENCOUNTER (OUTPATIENT)
Dept: GENERAL RADIOLOGY | Age: 51
Discharge: HOME | End: 2021-08-11
Payer: MEDICARE

## 2021-08-09 VITALS — TEMPERATURE: 97.2 F | WEIGHT: 280 LBS | BODY MASS INDEX: 32.4 KG/M2 | HEIGHT: 78 IN

## 2021-08-09 DIAGNOSIS — S82.142D CLOSED FRACTURE OF LEFT TIBIAL PLATEAU WITH ROUTINE HEALING, SUBSEQUENT ENCOUNTER: ICD-10-CM

## 2021-08-09 DIAGNOSIS — S82.142D CLOSED FRACTURE OF LEFT TIBIAL PLATEAU WITH ROUTINE HEALING, SUBSEQUENT ENCOUNTER: Primary | ICD-10-CM

## 2021-08-09 PROCEDURE — 99024 POSTOP FOLLOW-UP VISIT: CPT | Performed by: PHYSICIAN ASSISTANT

## 2021-08-09 PROCEDURE — 73560 X-RAY EXAM OF KNEE 1 OR 2: CPT

## 2021-08-09 PROCEDURE — 99212 OFFICE O/P EST SF 10 MIN: CPT | Performed by: PHYSICIAN ASSISTANT

## 2021-08-09 NOTE — PROGRESS NOTES
Chief Complaint   Patient presents with    Post-Op Check     L tib plat DOS 06/24/2021        OP:SURGEON: Dr. Artur Churchill MD  DATE OF PROCEDURE: 6/24/21  PROCEDURE:Left tibial plateau ORIF    POD: 6 weeks    Subjective:  Terrell Tamez is following up from the above surgery. He is TDWB on left lower extremity. Has maintained hinged ROM brace. He ambulates with assistive device, walker. Pain to extremity is controlled and is not taking prescribed pain medication. Continues with Calcium + Vitamin D supplementation. They denies numbness, tingling to the left lower extremity. Denies calf pain, chest pain, or shortness of breath. Patient has finished DVT prophylaxis, ASA 81 mg BID. Patient is not participating in therapy. Review of Systems -  All pertinent positives/negative in HPI     Objective:    General: Alert and oriented X 3, normocephalic atraumatic, external ears and eye normal, sclera clear, no acute distress, respirations easy and unlabored with no audible wheezes, skin warm and dry, speech and dress appropriate for noted age, affect euthymic. Extremity:  Left Lower Extremity  Skin clean dry and intact, without signs of infection   Majority of incision well healed. There are 2 cm areas at the distal 1/3 of incision with superficial abraded skin with granulation tissue at the base. This correlates at the area of his brace strap.  moderate edema noted around the knee  Visible quadriceps atrophy, unable to activate quadriceps muscle on command  Compartments supple throughout thigh and leg  Calf supple and not tender  negative Homans  Demonstrates active knee flexion, patient unable to actively extend the knee, passively can extend to 15° from full extension. Active ankle DF/PF, + EHL   States sensation intact to touch in sural, deep peroneal, superficial peroneal, saphenous, posterior tibial  nerve distributions to foot/ankle.   Palpable dorsalis pedis and posterior tibialis pulses, cap refill brisk in toes, foot warm/perfused. Temp 97.2 °F (36.2 °C) (Oral)   Ht 6' 7\" (2.007 m)   Wt 280 lb (127 kg)   BMI 31.54 kg/m²     XR:   AP/Lateral views of the left knee demonstrates tibial plateau fracture, there is some scant callus formation but fracture lines still well visualized. There is no evidence of hardware failure or loosening. No screw back out. Assessment:   Diagnosis Orders   1. Closed fracture of left tibial plateau with routine healing, subsequent encounter         Plan:  Weightbearing: Continue nonweightbearing to the left lower extremity, okay to touch down the leg for balance if needed    Wound care: Recommend that you cover open area to distal incision particularly when wearing hinged range of motion brace. Dry dressings daily    Do not soak this area in water, must have waterproof occlusive dressing    Okay to take off hinged range of motion brace when not up and ambulating    Swelling Control: Elevate leg(s) above the level of the heart when sitting   Ice to operative/injured site for 15-30 minutes of each hour for next 5 days    Recommend that you continue to ice the area 2-3 times per day after this     Pain Control: Tylenol/Ibuprofen as needed    Therapy: Outpatient physical therapy ordered today. Strongly encourage you attend PT due to significant muscle atrophy    Follow up: In 4 weeks with x-rays      Electronically signed by Ned Loera PA-C on 8/9/2021 at 1:20 PM  Note: This report was completed using [employer] voiced recognition software. Every effort has been made to ensure accuracy; however, inadvertent computerized transcription errors may be present.

## 2021-08-09 NOTE — PATIENT INSTRUCTIONS
Weightbearing: Continue nonweightbearing to the left lower extremity, okay to touch down the leg for balance if needed    Wound care: Recommend that you cover open area to distal incision particularly when wearing hinged range of motion brace. Dry dressings daily    Do not soak this area in water, must have waterproof occlusive dressing    Okay to take off hinged range of motion brace when not up and ambulating    Swelling Control: Elevate leg(s) above the level of the heart when sitting   Ice to operative/injured site for 15-30 minutes of each hour for next 5 days    Recommend that you continue to ice the area 2-3 times per day after this     Pain Control: Tylenol/Ibuprofen as needed    Therapy: Outpatient physical therapy ordered today. Strongly encourage you attend PT due to significant muscle atrophy    Follow up: In 4 weeks with x-rays      Exercise below is only a starter to try to improve knee extension and quadriceps activation.  Strongly encourage formal physical therapy

## 2021-08-12 ENCOUNTER — EVALUATION (OUTPATIENT)
Dept: PHYSICAL THERAPY | Age: 51
End: 2021-08-12
Payer: MEDICARE

## 2021-08-12 DIAGNOSIS — S82.142D CLOSED FRACTURE OF LEFT TIBIAL PLATEAU WITH ROUTINE HEALING, SUBSEQUENT ENCOUNTER: Primary | ICD-10-CM

## 2021-08-12 PROCEDURE — 97161 PT EVAL LOW COMPLEX 20 MIN: CPT | Performed by: PHYSICAL THERAPIST

## 2021-08-12 PROCEDURE — 97110 THERAPEUTIC EXERCISES: CPT | Performed by: PHYSICAL THERAPIST

## 2021-08-12 NOTE — PROGRESS NOTES
[facility]          Phone: [phone] Fax: [phone]    Physical Therapy Daily Treatment Note  Date:  2021    Patient Name:  Jossue Borrero    :  1970  MRN: [id number]    Restrictions/Precautions:  NWB L LE, tibial plateau protocol  Diagnosis:     Diagnosis Orders   1.  Closed fracture of left tibial plateau with routine healing, subsequent encounter       Treatment Diagnosis:    Insurance/Certification information:  [employer]  Referring Physician:  Danielito Presley PA-C  Plan of care signed (Y/N):    Visit# / total visits:    Pain level: 0/10   Time In:  1455  Time Out:  1535    Subjective:  See evaluation    Exercises:  Exercise/Equipment Resistance/Repetitions Other comments     Quad sets 5 sec x 10 reps      Hamstring sets 5 sec x 10 reps      Heel slides x5-10 reps      SAQ AAROM x 10 reps      Supine hip abd/add       Standing hip 3 way L LE only      Seated hip flex                                                                                              Other Therapeutic Activities:  PT evaluation completed    Home Exercise Program:  21 - quad sets, hamstring sets, SAQ, heel slides    Manual Treatments:  N/A    Modalities:  N/A     Time-in Time-out Total Time   80971  Evaluation Low Complexity 1455 1520 25   29801  Evaluation Med Complexity      20154  Evaluation High Complexity      86483  Ther Ex 1520 1535 15   73100  Neuro Re-ed        30388  Ther Activities        78684  Manual Therapy       33941  E-stim       68754  Ultrasound            Session 7722 9323 04       Treatment/Activity Tolerance:  [x] Patient tolerated treatment well [] Patient limited by fatigue  [] Patient limited by pain  [] Patient limited by other medical complications  [] Other:     Prognosis: [x] Good [] Fair  [] Poor    Patient Requires Follow-up: [x] Yes  [] No    Plan:   [] Continue per plan of care [] Alter current plan (see comments)  [x] Plan of care initiated [] Hold pending MD visit [] Discharge  Plan for Next Session:        Electronically signed by:  Mary Lou Pope, [address] [facility], [id number]
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
## 2021-08-18 ENCOUNTER — TREATMENT (OUTPATIENT)
Dept: PHYSICAL THERAPY | Age: 51
End: 2021-08-18
Payer: MEDICARE

## 2021-08-18 DIAGNOSIS — S82.142D CLOSED FRACTURE OF LEFT TIBIAL PLATEAU WITH ROUTINE HEALING, SUBSEQUENT ENCOUNTER: Primary | ICD-10-CM

## 2021-08-18 PROCEDURE — 97110 THERAPEUTIC EXERCISES: CPT

## 2021-08-18 NOTE — PROGRESS NOTES
[facility]          Phone: [phone] Fax: [phone]    Physical Therapy Daily Treatment Note  Date:  2021    Patient Name:  Marcus Lucero    :  1970  MRN: [id number]    Restrictions/Precautions:  NWB L LE, tibial plateau protocol  Diagnosis:     Diagnosis Orders   1. Closed fracture of left tibial plateau with routine healing, subsequent encounter       Treatment Diagnosis:    Insurance/Certification information:  [employer]  Referring Physician:  Uriel Fair PA-C  Plan of care signed (Y/N):    Visit# / total visits:    Pain level: 0/10   Time In:  950  Time Out:  1010    Subjective:  Pt reported compliance w/ HEP    Exercises:  Exercise/Equipment Resistance/Repetitions Other comments     Quad sets 5 sec x 10 reps      Hamstring sets 5 sec x 10 reps      Heel slides x5-10 reps      SAQ AAROM x 10 reps      Supine hip abd/add 2 x 10 reps       Standing hip 3 way L LE only  X 10 reps       Seated hip flex 3 sec 2 x 10 reps                                                                                              Other   Pt performed w/ fair pacing, cuing for technique occasionally. Pt reported mild pain in \" knee cap \" w/ SAQ's, but returned to 0/10 when ex stopped.      Home Exercise Program:  21 - quad sets, hamstring sets, SAQ, heel slides    Manual Treatments:  N/A    Modalities:  N/A     Time-in Time-out Total Time   82668  Evaluation Low Complexity      79811  Evaluation Med Complexity      87272  Evaluation High Complexity      50054  Ther Ex 950 1010 20   20289  Neuro Re-ed        18898  Ther Activities        20755  Manual Therapy       02131  E-stim       13003  Ultrasound            Session 950 1010 20       Treatment/Activity Tolerance:  [x] Patient tolerated treatment well [] Patient limited by fatigue  [] Patient limited by pain  [] Patient limited by other medical complications  [] Other:     Prognosis: [x] Good [] Fair  [] Poor    Patient Requires Follow-up: [x] Yes  [] No    Plan:   [x] Continue per plan of care [] Alter current plan (see comments)  [] Plan of care initiated [] Hold pending MD visit [] Discharge  Plan for Next Session:        Electronically signed by:  Sravan Villalta, PTA [id number]

## 2021-08-20 ENCOUNTER — TREATMENT (OUTPATIENT)
Dept: PHYSICAL THERAPY | Age: 51
End: 2021-08-20
Payer: MEDICARE

## 2021-08-20 DIAGNOSIS — S82.142D CLOSED FRACTURE OF LEFT TIBIAL PLATEAU WITH ROUTINE HEALING, SUBSEQUENT ENCOUNTER: Primary | ICD-10-CM

## 2021-08-20 PROCEDURE — 97110 THERAPEUTIC EXERCISES: CPT | Performed by: PHYSICAL THERAPIST

## 2021-08-20 NOTE — PROGRESS NOTES
[facility]          Phone: [phone] Fax: [phone]    Physical Therapy Daily Treatment Note  Date:  2021    Patient Name:  Torsten Stevenson    :  1970  MRN: [id number]    Restrictions/Precautions:  NWB L LE, tibial plateau protocol  Diagnosis:     Diagnosis Orders   1.  Closed fracture of left tibial plateau with routine healing, subsequent encounter       Treatment Diagnosis:    Insurance/Certification information:  [employer]  Referring Physician:  Jana Cline PA-C  Plan of care signed (Y/N):    Visit# / total visits:  3/16  Pain level: 0/10   Time In:  1000  Time Out:  1020    Subjective:  Pt reported compliance w/ HEP    Exercises:  Exercise/Equipment Resistance/Repetitions Other comments     Quad sets 5 sec x 15 reps      Hamstring sets 5 sec x 15 reps      Heel slides x15 reps      SAQ A/AAROM x 15 reps      Supine hip abd/add 2 x 10 reps       Standing hip 3 way L LE only  X 15 reps       Seated hip flex 3 sec 2 x 10 reps                                                                                              Other        Home Exercise Program:  21 - quad sets, hamstring sets, SAQ, heel slides    Manual Treatments:  N/A    Modalities:  N/A     Time-in Time-out Total Time   15256  Evaluation Low Complexity      16935  Evaluation Med Complexity      15573  Evaluation High Complexity      29299  Ther Ex 1000 1020 20   10041  Neuro Re-ed        33064  Ther Activities        31349  Manual Therapy       53002  E-stim       70636  Ultrasound            Session 1000 1020 20       Treatment/Activity Tolerance:  [x] Patient tolerated treatment well [] Patient limited by fatigue  [] Patient limited by pain  [] Patient limited by other medical complications  [] Other:     Prognosis: [x] Good [] Fair  [] Poor    Patient Requires Follow-up: [x] Yes  [] No    Plan:   [x] Continue per plan of care [] Alter current plan (see comments)  [] Plan of care initiated [] Hold pending MD visit [] Discharge  Plan for Next Session:        Electronically signed by:  Forrest Miller, [address] [facility], [id number]

## 2021-08-24 ENCOUNTER — TREATMENT (OUTPATIENT)
Dept: PHYSICAL THERAPY | Age: 51
End: 2021-08-24
Payer: MEDICARE

## 2021-08-24 DIAGNOSIS — S82.142D CLOSED FRACTURE OF LEFT TIBIAL PLATEAU WITH ROUTINE HEALING, SUBSEQUENT ENCOUNTER: Primary | ICD-10-CM

## 2021-08-24 PROCEDURE — 97110 THERAPEUTIC EXERCISES: CPT

## 2021-08-24 NOTE — PROGRESS NOTES
[facility]          Phone: [phone] Fax: [phone]    Physical Therapy Daily Treatment Note  Date:  2021    Patient Name:  Nery Little    :  1970  MRN: [id number]    Restrictions/Precautions:  NWB L LE, tibial plateau protocol  Diagnosis:     Diagnosis Orders   1.  Closed fracture of left tibial plateau with routine healing, subsequent encounter       Treatment Diagnosis:    Insurance/Certification information:  [employer]  Referring Physician:  Lisa Mcgrath PA-C  Plan of care signed (Y/N):    Visit# / total visits:    Pain level: 0/10   Time In:  958  Time Out:  1022    Subjective:  Pt reported compliance w/ HEP    Exercises:  Exercise/Equipment Resistance/Repetitions Other comments     Quad sets 5 sec x 15 reps      Hamstring sets 5 sec x 15 reps      Heel slides x15 reps      SAQ A/AAROM x 15 reps      Supine hip abd/add 2 x 10 reps       Standing hip 3 way L LE only  2 X 10 reps       Seated hip flex 3 sec 2 x 10 reps                                                                                              Other    Tolerated, no c/o pain at end of session     Home Exercise Program:  21 - quad sets, hamstring sets, SAQ, heel slides    Manual Treatments:  N/A    Modalities:  N/A     Time-in Time-out Total Time   86133  Evaluation Low Complexity      12678  Evaluation Med Complexity      56054  Evaluation High Complexity      17471  Ther Ex 958 1022 24   19963  Neuro Re-ed        91786  Ther Activities        90582  Manual Therapy       68030  E-stim       74678  Ultrasound            Session 337 2618 24       Treatment/Activity Tolerance:  [x] Patient tolerated treatment well [] Patient limited by fatigue  [] Patient limited by pain  [] Patient limited by other medical complications  [] Other:     Prognosis: [x] Good [] Fair  [] Poor    Patient Requires Follow-up: [x] Yes  [] No    Plan:   [x] Continue per plan of care [] Alter current plan (see comments)  [] Plan of care initiated [] Hold pending MD visit [] Discharge  Plan for Next Session:        Electronically signed by:  Jenni Ortiz, PTA [id number]

## 2021-09-01 ENCOUNTER — TELEPHONE (OUTPATIENT)
Dept: ORTHOPEDIC SURGERY | Age: 51
End: 2021-09-01

## 2021-09-01 DIAGNOSIS — S82.142D CLOSED FRACTURE OF LEFT TIBIAL PLATEAU WITH ROUTINE HEALING, SUBSEQUENT ENCOUNTER: Primary | ICD-10-CM

## 2021-09-08 ENCOUNTER — HOSPITAL ENCOUNTER (OUTPATIENT)
Dept: GENERAL RADIOLOGY | Age: 51
Discharge: HOME | End: 2021-09-10
Payer: MEDICARE

## 2021-09-08 ENCOUNTER — OFFICE VISIT (OUTPATIENT)
Dept: ORTHOPEDIC SURGERY | Age: 51
End: 2021-09-08
Payer: MEDICARE

## 2021-09-08 VITALS — BODY MASS INDEX: 31.82 KG/M2 | WEIGHT: 275 LBS | TEMPERATURE: 97.8 F | RESPIRATION RATE: 20 BRPM | HEIGHT: 78 IN

## 2021-09-08 DIAGNOSIS — S82.142D CLOSED FRACTURE OF LEFT TIBIAL PLATEAU WITH ROUTINE HEALING, SUBSEQUENT ENCOUNTER: Primary | ICD-10-CM

## 2021-09-08 DIAGNOSIS — S82.142D CLOSED FRACTURE OF LEFT TIBIAL PLATEAU WITH ROUTINE HEALING, SUBSEQUENT ENCOUNTER: ICD-10-CM

## 2021-09-08 PROCEDURE — 99024 POSTOP FOLLOW-UP VISIT: CPT | Performed by: PHYSICIAN ASSISTANT

## 2021-09-08 PROCEDURE — 99212 OFFICE O/P EST SF 10 MIN: CPT

## 2021-09-08 PROCEDURE — 73560 X-RAY EXAM OF KNEE 1 OR 2: CPT

## 2021-09-08 RX ORDER — CEPHALEXIN 500 MG/1
500 CAPSULE ORAL 4 TIMES DAILY
Qty: 40 CAPSULE | Refills: 0 | Status: SHIPPED | OUTPATIENT
Start: 2021-09-08 | End: 2021-09-18

## 2021-09-08 NOTE — PROGRESS NOTES
Chief Complaint   Patient presents with    Follow Up After Procedure     13.5 weeks out. Removal of external fixator under anesthesia left lower extremity, Open reduction internal fixation left bicondylar tibial plateau fracture with plate and screws       OP:SURGEON: Dr. Duane Enrique MD  DATE OF PROCEDURE: 6/24/21  PROCEDURE:Left tibial plateau ORIF       Subjective:  Gordy New is approximately 10 weeks the above date of surgery. His nonweightbearing left lower extremity and crutches for support. States that he is going to physical therapy twice weekly however his hinged ROM brace is set 0-90 PT has not been advanced this according to tibial plateau protocol. Pain is minimal at the left knee tolerable. States that less than 1 week ago he had Ace wrap and hinged ROM knee brace on and was outside, his left lower extremity became diaphoretic and warm, and he subsequently started scratching his left leg due to pruritus. He then noticed an open wound to the left anterior leg that is not painful or warm or actively draining. States that he has not been covering this. Did not seek medical attention for this. Denies calf pain, CP, SOB, fever, chills, malaise. Review of Systems -  all pertinent positives and negatives in HPI. Objective:    General: Alert and oriented X 3, normocephalic atraumatic, external ears and eye normal, sclera clear, no acute distress, respirations easy and unlabored with no audible wheezes, skin warm and dry, speech and dress appropriate for noted age, affect euthymic. Extremity:  Left Lower Extremity  Incision healed   Mild edema noted globally about the L knee  Compartments supple throughout thigh and leg  Calf supple and nontender  Demonstrates active knee flexion/extension, ankle plantar/dorsiflexion/great toe extension. States sensation intact to touch in sural/deep peroneal/superficial peroneal/saphenous/posterior tibial nerve distributions to foot/ankle. Palpable dorsalis pedis and posterior tibialis pulses, cap refill brisk in toes, foot warm/perfused. AROM L knee   nontender about the knee to palpation  Open wounds x2 to the mid anterior L leg with slight purulence to the most proximal wound with surrounding erythema, no warmth or active drainage, nontender to palpation                Temp 97.8 °F (36.6 °C) (Oral)   Resp 20   Ht 6' 7\" (2.007 m)   Wt 275 lb (124.7 kg)   BMI 30.98 kg/m²     XR:   2 views of L knee demonstrating interval healing of proximal tibial fracture, fracture lucency still apparent. Hardware remains intact without interval displacement, loosening, or failure. No significant change in alignment. No acute fractures or dislocations or any other osseus abnormality identified. Assessment:   Diagnosis Orders   1. Closed fracture of left tibial plateau with routine healing, subsequent encounter         Plan:   Reviewed x-rays with patient today in office    Continue NWB-TTWB L LE, hinged knee brace completely unlocked.  Continue PT following tibial plateau protocol at 10 weeks for further ROM and strengthening, modalities as needed, HEP.  Keflex sent to pharmacy - take as prescribed. S/s of infx reviewed and patient is to return to clinic or go to ED if conditions worsen. Recommend keeping L LE wound covered at all times with dry dressings which were applied today in office. Change as often as needed if soiled or saturated. Do not scratch L LE. Recommend otc anti-histamine PRN.  otc analgesics PRN    Follow up in 10-14 days for wound check and will plan to advance WB at that time. Electronically signed by Shira Moran PA-C on 9/8/2021 at 2:06 PM  Note: This report was completed using [employer] voiced recognition software. Every effort has been made to ensure accuracy; however, inadvertent computerized transcription errors may be present.

## 2021-09-15 ENCOUNTER — OFFICE VISIT (OUTPATIENT)
Dept: ORTHOPEDIC SURGERY | Age: 51
End: 2021-09-15
Payer: MEDICARE

## 2021-09-15 VITALS — TEMPERATURE: 98.4 F

## 2021-09-15 DIAGNOSIS — S82.142D CLOSED FRACTURE OF LEFT TIBIAL PLATEAU WITH ROUTINE HEALING, SUBSEQUENT ENCOUNTER: Primary | ICD-10-CM

## 2021-09-15 PROCEDURE — 99024 POSTOP FOLLOW-UP VISIT: CPT | Performed by: PHYSICIAN ASSISTANT

## 2021-09-15 PROCEDURE — 99212 OFFICE O/P EST SF 10 MIN: CPT | Performed by: PHYSICIAN ASSISTANT

## 2021-09-15 NOTE — PATIENT INSTRUCTIONS
[address] [facility]   [address], [address] [facility] [address]     [facility]: [phone]   [facility]: [phone]

## 2021-09-15 NOTE — PROGRESS NOTES
Chief Complaint   Patient presents with    Follow-up     12wks L knee tib plateau ORIF 1/89/56. Pain 0/10, denies numbness or tinlging. Scab at distal incision. Slight redness to knee, some swelling. Presents without hinged ROM, claims compliant with NWB status. Not in formal therapy.  Other     XR in EPIC    Other     Skin check       OP:SURGEON: Dr. Alia Cabello MD  DATE OF PROCEDURE: 6/24/21  PROCEDURE:Left tibial plateau ORIF    Subjective:  Lenore Lopez is approximately 12 wks from above DOS. He has been toe-touch to about 25% weightbearing to the left lower extremity using walker for support. He is not currently in physical therapy and states that [facility] physical therapy in [place] did not call him. Does not present with hinged ROM brace today. Although this is fully unlocked and states that this is in his car. Still taking Keflex for left lower extremity wounds. Proximal wound has completely crusted, but patient states that he distal wound crust fell off and is now open, but denies any drainage, warmth, erythema. No new complaints today. Minimal pain to the left knee. Here today for wound check. Denies calf pain, CP, SOB, fever, chills, malaise. Review of Systems -  all pertinent positives and negatives in HPI. Objective:    General: Alert and oriented X 3, normocephalic atraumatic, external ears and eye normal, sclera clear, no acute distress, respirations easy and unlabored with no audible wheezes, skin warm and dry, speech and dress appropriate for noted age, affect euthymic.     Extremity:  Left Lower Extremity  Proximal anterior L leg wound with crust, no open skin, no drainage  Distal anterior L leg wound still open without crust, but no drainage  Mild surrounding warmth, but improved compared to last OV and surrounding erythema also resolved   nontender to palpation surround the wounds and about the knee   Compartments supple throughout thigh and leg  Calf supple and nontender  Demonstrates active knee flexion/extension, ankle plantar/dorsiflexion/great toe extension. States sensation intact to touch in sural/deep peroneal/superficial peroneal/saphenous/posterior tibial nerve distributions to foot/ankle. Palpable dorsalis pedis and posterior tibialis pulses, cap refill brisk in toes, foot warm/perfused. Temp 98.4 °F (36.9 °C)       Assessment:   Diagnosis Orders   1. Closed fracture of left tibial plateau with routine healing, subsequent encounter  XR KNEE LEFT (1-2 VIEWS)       Plan:   Reviewed x-rays with patient today in office    Continue covering all wounds until fully healed    Continue full prescription of Keflex    Can start PWB 25% per week WITH HINGED BRACE ON AND UNLOCKED as tolerated. Use walker for support. Can advance 25% per week if tolerable   Continue monitoring for s/s of infx    New PT order placed today      Follow up in 4-6 weeks with repeat XR    Electronically signed by Mark Anthony Olsen PA-C on 9/15/2021 at 2:48 PM  Note: This report was completed using [employer] voiced recognition software. Every effort has been made to ensure accuracy; however, inadvertent computerized transcription errors may be present.

## 2023-04-18 ENCOUNTER — APPOINTMENT (OUTPATIENT)
Dept: GENERAL RADIOLOGY | Age: 53
DRG: 493 | End: 2023-04-18
Payer: COMMERCIAL

## 2023-04-18 ENCOUNTER — OFFICE VISIT (OUTPATIENT)
Dept: ORTHOPEDIC SURGERY | Age: 53
End: 2023-04-18
Payer: COMMERCIAL

## 2023-04-18 VITALS — BODY MASS INDEX: 30.66 KG/M2 | HEIGHT: 78 IN | WEIGHT: 265 LBS

## 2023-04-18 DIAGNOSIS — L03.116 CELLULITIS OF KNEE, LEFT: ICD-10-CM

## 2023-04-18 DIAGNOSIS — M25.562 LEFT KNEE PAIN, UNSPECIFIED CHRONICITY: Primary | ICD-10-CM

## 2023-04-18 DIAGNOSIS — T81.40XA POSTOPERATIVE INFECTION, UNSPECIFIED TYPE, INITIAL ENCOUNTER: ICD-10-CM

## 2023-04-18 LAB
ALBUMIN SERPL-MCNC: 3.5 G/DL (ref 3.5–5.2)
ALP SERPL-CCNC: 108 U/L (ref 40–129)
ALT SERPL-CCNC: 12 U/L (ref 0–40)
ANION GAP SERPL CALCULATED.3IONS-SCNC: 8 MMOL/L (ref 7–16)
AST SERPL-CCNC: 12 U/L (ref 0–39)
BASOPHILS # BLD: 0.1 E9/L (ref 0–0.2)
BASOPHILS NFR BLD: 1.1 % (ref 0–2)
BILIRUB SERPL-MCNC: 0.3 MG/DL (ref 0–1.2)
BUN SERPL-MCNC: 10 MG/DL (ref 6–20)
CALCIUM SERPL-MCNC: 9.2 MG/DL (ref 8.6–10.2)
CHLORIDE SERPL-SCNC: 101 MMOL/L (ref 98–107)
CO2 SERPL-SCNC: 28 MMOL/L (ref 22–29)
CREAT SERPL-MCNC: 0.8 MG/DL (ref 0.7–1.2)
CRP SERPL HS-MCNC: 4.9 MG/DL (ref 0–0.4)
EOSINOPHIL # BLD: 0.55 E9/L (ref 0.05–0.5)
EOSINOPHIL NFR BLD: 5.9 % (ref 0–6)
ERYTHROCYTE [DISTWIDTH] IN BLOOD BY AUTOMATED COUNT: 12.6 FL (ref 11.5–15)
ERYTHROCYTE [SEDIMENTATION RATE] IN BLOOD BY WESTERGREN METHOD: 60 MM/HR (ref 0–15)
GLUCOSE SERPL-MCNC: 127 MG/DL (ref 74–99)
HCT VFR BLD AUTO: 39.8 % (ref 37–54)
HGB BLD-MCNC: 13 G/DL (ref 12.5–16.5)
IMM GRANULOCYTES # BLD: 0.02 E9/L
IMM GRANULOCYTES NFR BLD: 0.2 % (ref 0–5)
LACTATE BLDV-SCNC: 1.8 MMOL/L (ref 0.5–2.2)
LYMPHOCYTES # BLD: 1.61 E9/L (ref 1.5–4)
LYMPHOCYTES NFR BLD: 17.4 % (ref 20–42)
MCH RBC QN AUTO: 28.2 PG (ref 26–35)
MCHC RBC AUTO-ENTMCNC: 32.7 % (ref 32–34.5)
MCV RBC AUTO: 86.3 FL (ref 80–99.9)
MONOCYTES # BLD: 0.77 E9/L (ref 0.1–0.95)
MONOCYTES NFR BLD: 8.3 % (ref 2–12)
NEUTROPHILS # BLD: 6.21 E9/L (ref 1.8–7.3)
NEUTS SEG NFR BLD: 67.1 % (ref 43–80)
PLATELET # BLD AUTO: 463 E9/L (ref 130–450)
PMV BLD AUTO: 9.2 FL (ref 7–12)
POTASSIUM SERPL-SCNC: 3.8 MMOL/L (ref 3.5–5)
PROT SERPL-MCNC: 8 G/DL (ref 6.4–8.3)
RBC # BLD AUTO: 4.61 E12/L (ref 3.8–5.8)
SODIUM SERPL-SCNC: 137 MMOL/L (ref 132–146)
WBC # BLD: 9.3 E9/L (ref 4.5–11.5)

## 2023-04-18 PROCEDURE — 86140 C-REACTIVE PROTEIN: CPT

## 2023-04-18 PROCEDURE — 85025 COMPLETE CBC W/AUTO DIFF WBC: CPT

## 2023-04-18 PROCEDURE — 99212 OFFICE O/P EST SF 10 MIN: CPT | Performed by: PHYSICIAN ASSISTANT

## 2023-04-18 PROCEDURE — 85651 RBC SED RATE NONAUTOMATED: CPT

## 2023-04-18 PROCEDURE — 99285 EMERGENCY DEPT VISIT HI MDM: CPT

## 2023-04-18 PROCEDURE — 83605 ASSAY OF LACTIC ACID: CPT

## 2023-04-18 PROCEDURE — 80053 COMPREHEN METABOLIC PANEL: CPT

## 2023-04-18 RX ORDER — OXYCODONE HYDROCHLORIDE AND ACETAMINOPHEN 5; 325 MG/1; MG/1
1 TABLET ORAL ONCE
Status: COMPLETED | OUTPATIENT
Start: 2023-04-18 | End: 2023-04-19

## 2023-04-18 ASSESSMENT — PAIN DESCRIPTION - ORIENTATION: ORIENTATION: LEFT

## 2023-04-18 ASSESSMENT — PAIN SCALES - GENERAL: PAINLEVEL_OUTOF10: 4

## 2023-04-18 ASSESSMENT — PAIN - FUNCTIONAL ASSESSMENT: PAIN_FUNCTIONAL_ASSESSMENT: 0-10

## 2023-04-18 ASSESSMENT — PAIN DESCRIPTION - LOCATION: LOCATION: KNEE

## 2023-04-18 NOTE — PROGRESS NOTES
unspecified chronicity  XR KNEE LEFT (MIN 4 VIEWS)      2. Cellulitis of knee, left        3. Postoperative infection, unspecified type, initial encounter            PLAN:  Patient is a pleasant 50-year-old male who presents to the clinic today for evaluation of left knee pain that began over the course of the last few weeks with no known mechanism of injury. He has a history of left tibial plateau fracture which was surgically corrected by Dr. Brooklyn Barrios 6/2021. On exam today, he has a edematous knee with erythema and warmth. He does have a small opening at the proximal portion of his incisional scar that is open and actively draining a tony serosanginous type fluid. I did obtain 4 view left knee x-rays in the office today which is no acute findings of fracture or dislocation. He does have appropriate fixation with internal plate system with no evidence of broken or loosening implants. After discussing with Ortho trauma team, I did recommend the patient proceed to [facility] emergency department for concerns of infected hardware. Patient voiced understanding and agrees with treatment on form in the office today. If his additional questions or concerns he will call office. Otherwise, I will see him back on an as-needed basis and defer further care to orthopedic trauma team.        Electronically signed by Kody Liao PA-C on 4/18/23 at 12:33 PM EDT    **This report was transcribed using voice recognition software. Every effort was made to ensure accuracy; however, inadvertent computerized transcription errors may be present. **

## 2023-04-19 ENCOUNTER — APPOINTMENT (OUTPATIENT)
Dept: GENERAL RADIOLOGY | Age: 53
DRG: 493 | End: 2023-04-19
Payer: COMMERCIAL

## 2023-04-19 ENCOUNTER — ANESTHESIA EVENT (OUTPATIENT)
Dept: OPERATING ROOM | Age: 53
End: 2023-04-19
Payer: COMMERCIAL

## 2023-04-19 ENCOUNTER — HOSPITAL ENCOUNTER (INPATIENT)
Age: 53
DRG: 493 | End: 2023-04-19
Attending: EMERGENCY MEDICINE | Admitting: ORTHOPAEDIC SURGERY
Payer: COMMERCIAL

## 2023-04-19 ENCOUNTER — ANESTHESIA (OUTPATIENT)
Dept: OPERATING ROOM | Age: 53
End: 2023-04-19
Payer: COMMERCIAL

## 2023-04-19 DIAGNOSIS — T84.7XXA HARDWARE COMPLICATING WOUND INFECTION, INITIAL ENCOUNTER (HCC): Primary | ICD-10-CM

## 2023-04-19 DIAGNOSIS — M00.9 PYOGENIC ARTHRITIS OF LEFT KNEE JOINT, DUE TO UNSPECIFIED ORGANISM (HCC): ICD-10-CM

## 2023-04-19 PROBLEM — S82.142A CLOSED BICONDYLAR FRACTURE OF LEFT TIBIAL PLATEAU: Status: ACTIVE | Noted: 2023-04-19

## 2023-04-19 LAB
ABO + RH BLD: NORMAL
ALBUMIN SERPL-MCNC: 3.5 G/DL (ref 3.5–5.2)
ALP SERPL-CCNC: 114 U/L (ref 40–129)
ALT SERPL-CCNC: 11 U/L (ref 0–40)
ANION GAP SERPL CALCULATED.3IONS-SCNC: 9 MMOL/L (ref 7–16)
APPEARANCE FLUID: NORMAL
AST SERPL-CCNC: 10 U/L (ref 0–39)
BILIRUB SERPL-MCNC: 0.5 MG/DL (ref 0–1.2)
BLD GP AB SCN SERPL QL: NORMAL
BUN SERPL-MCNC: 12 MG/DL (ref 6–20)
CALCIUM SERPL-MCNC: 9.6 MG/DL (ref 8.6–10.2)
CELL COUNT FLUID TYPE: NORMAL
CHLORIDE SERPL-SCNC: 101 MMOL/L (ref 98–107)
CO2 SERPL-SCNC: 29 MMOL/L (ref 22–29)
COLOR FLUID: YELLOW
CREAT SERPL-MCNC: 0.9 MG/DL (ref 0.7–1.2)
CRYSTALS, FLUID: NORMAL
EKG ATRIAL RATE: 71 BPM
EKG P AXIS: -8 DEGREES
EKG P-R INTERVAL: 186 MS
EKG Q-T INTERVAL: 424 MS
EKG QRS DURATION: 92 MS
EKG QTC CALCULATION (BAZETT): 460 MS
EKG R AXIS: -27 DEGREES
EKG T AXIS: -12 DEGREES
EKG VENTRICULAR RATE: 71 BPM
ERYTHROCYTE [DISTWIDTH] IN BLOOD BY AUTOMATED COUNT: 12.5 FL (ref 11.5–15)
FLUID TYPE: NORMAL
GLUCOSE FLD-MCNC: 46 MG/DL
GLUCOSE SERPL-MCNC: 109 MG/DL (ref 74–99)
HCT VFR BLD AUTO: 39.3 % (ref 37–54)
HGB BLD-MCNC: 12.6 G/DL (ref 12.5–16.5)
INR BLD: 1.2
MCH RBC QN AUTO: 28.3 PG (ref 26–35)
MCHC RBC AUTO-ENTMCNC: 32.1 % (ref 32–34.5)
MCV RBC AUTO: 88.1 FL (ref 80–99.9)
MONOCYTE, FLUID: 8 %
NEUTROPHIL, FLUID: 92 %
NUCLEATED CELLS FLUID: NORMAL /UL
PLATELET # BLD AUTO: 383 E9/L (ref 130–450)
PMV BLD AUTO: 9 FL (ref 7–12)
POTASSIUM SERPL-SCNC: 4.3 MMOL/L (ref 3.5–5)
PROT SERPL-MCNC: 8.1 G/DL (ref 6.4–8.3)
PROTHROMBIN TIME: 13.3 SEC (ref 9.3–12.4)
RBC # BLD AUTO: 4.46 E12/L (ref 3.8–5.8)
RBC FLUID: NORMAL /UL
SODIUM SERPL-SCNC: 139 MMOL/L (ref 132–146)
SOURCE BODY FLUID: NORMAL
WBC # BLD: 9.3 E9/L (ref 4.5–11.5)

## 2023-04-19 PROCEDURE — 0QPH04Z REMOVAL OF INTERNAL FIXATION DEVICE FROM LEFT TIBIA, OPEN APPROACH: ICD-10-PCS | Performed by: ORTHOPAEDIC SURGERY

## 2023-04-19 PROCEDURE — 2500000003 HC RX 250 WO HCPCS: Performed by: NURSE ANESTHETIST, CERTIFIED REGISTERED

## 2023-04-19 PROCEDURE — 6370000000 HC RX 637 (ALT 250 FOR IP): Performed by: FAMILY MEDICINE

## 2023-04-19 PROCEDURE — 6360000002 HC RX W HCPCS: Performed by: NURSE ANESTHETIST, CERTIFIED REGISTERED

## 2023-04-19 PROCEDURE — 6360000002 HC RX W HCPCS

## 2023-04-19 PROCEDURE — 2500000003 HC RX 250 WO HCPCS

## 2023-04-19 PROCEDURE — 86850 RBC ANTIBODY SCREEN: CPT

## 2023-04-19 PROCEDURE — 89051 BODY FLUID CELL COUNT: CPT

## 2023-04-19 PROCEDURE — 3600000007 HC SURGERY HYBRID BASE: Performed by: ORTHOPAEDIC SURGERY

## 2023-04-19 PROCEDURE — 86901 BLOOD TYPING SEROLOGIC RH(D): CPT

## 2023-04-19 PROCEDURE — 2580000003 HC RX 258: Performed by: NURSE ANESTHETIST, CERTIFIED REGISTERED

## 2023-04-19 PROCEDURE — 85610 PROTHROMBIN TIME: CPT

## 2023-04-19 PROCEDURE — 87205 SMEAR GRAM STAIN: CPT

## 2023-04-19 PROCEDURE — 6360000002 HC RX W HCPCS: Performed by: INTERNAL MEDICINE

## 2023-04-19 PROCEDURE — 88300 SURGICAL PATH GROSS: CPT

## 2023-04-19 PROCEDURE — 93010 ELECTROCARDIOGRAM REPORT: CPT | Performed by: INTERNAL MEDICINE

## 2023-04-19 PROCEDURE — 3600000017 HC SURGERY HYBRID ADDL 15MIN: Performed by: ORTHOPAEDIC SURGERY

## 2023-04-19 PROCEDURE — 93005 ELECTROCARDIOGRAM TRACING: CPT

## 2023-04-19 PROCEDURE — 6370000000 HC RX 637 (ALT 250 FOR IP): Performed by: NURSE PRACTITIONER

## 2023-04-19 PROCEDURE — 87077 CULTURE AEROBIC IDENTIFY: CPT

## 2023-04-19 PROCEDURE — 87150 DNA/RNA AMPLIFIED PROBE: CPT

## 2023-04-19 PROCEDURE — 3700000001 HC ADD 15 MINUTES (ANESTHESIA): Performed by: ORTHOPAEDIC SURGERY

## 2023-04-19 PROCEDURE — 86900 BLOOD TYPING SEROLOGIC ABO: CPT

## 2023-04-19 PROCEDURE — 87075 CULTR BACTERIA EXCEPT BLOOD: CPT

## 2023-04-19 PROCEDURE — 6360000002 HC RX W HCPCS: Performed by: ORTHOPAEDIC SURGERY

## 2023-04-19 PROCEDURE — 87070 CULTURE OTHR SPECIMN AEROBIC: CPT

## 2023-04-19 PROCEDURE — 85027 COMPLETE CBC AUTOMATED: CPT

## 2023-04-19 PROCEDURE — 7100000000 HC PACU RECOVERY - FIRST 15 MIN: Performed by: ORTHOPAEDIC SURGERY

## 2023-04-19 PROCEDURE — 7100000001 HC PACU RECOVERY - ADDTL 15 MIN: Performed by: ORTHOPAEDIC SURGERY

## 2023-04-19 PROCEDURE — 87102 FUNGUS ISOLATION CULTURE: CPT

## 2023-04-19 PROCEDURE — 2500000003 HC RX 250 WO HCPCS: Performed by: ANESTHESIOLOGY

## 2023-04-19 PROCEDURE — 80053 COMPREHEN METABOLIC PANEL: CPT

## 2023-04-19 PROCEDURE — 87040 BLOOD CULTURE FOR BACTERIA: CPT

## 2023-04-19 PROCEDURE — 0QBH0ZZ EXCISION OF LEFT TIBIA, OPEN APPROACH: ICD-10-PCS | Performed by: ORTHOPAEDIC SURGERY

## 2023-04-19 PROCEDURE — 89060 EXAM SYNOVIAL FLUID CRYSTALS: CPT

## 2023-04-19 PROCEDURE — 6360000002 HC RX W HCPCS: Performed by: FAMILY MEDICINE

## 2023-04-19 PROCEDURE — 2580000003 HC RX 258

## 2023-04-19 PROCEDURE — 3700000000 HC ANESTHESIA ATTENDED CARE: Performed by: ORTHOPAEDIC SURGERY

## 2023-04-19 PROCEDURE — 1200000000 HC SEMI PRIVATE

## 2023-04-19 PROCEDURE — 2709999900 HC NON-CHARGEABLE SUPPLY: Performed by: ORTHOPAEDIC SURGERY

## 2023-04-19 PROCEDURE — C1713 ANCHOR/SCREW BN/BN,TIS/BN: HCPCS | Performed by: ORTHOPAEDIC SURGERY

## 2023-04-19 PROCEDURE — 3209999900 FLUORO FOR SURGICAL PROCEDURES

## 2023-04-19 PROCEDURE — 82947 ASSAY GLUCOSE BLOOD QUANT: CPT

## 2023-04-19 PROCEDURE — 2580000003 HC RX 258: Performed by: INTERNAL MEDICINE

## 2023-04-19 PROCEDURE — 87186 SC STD MICRODIL/AGAR DIL: CPT

## 2023-04-19 PROCEDURE — 2580000003 HC RX 258: Performed by: FAMILY MEDICINE

## 2023-04-19 PROCEDURE — 71045 X-RAY EXAM CHEST 1 VIEW: CPT

## 2023-04-19 DEVICE — RESORBABLE MINI BEAD KIT
Type: IMPLANTABLE DEVICE | Site: KNEE | Status: FUNCTIONAL
Brand: OSTEOSET

## 2023-04-19 RX ORDER — ASPIRIN 325 MG
325 TABLET, DELAYED RELEASE (ENTERIC COATED) ORAL DAILY
Qty: 30 TABLET | Refills: 0 | Status: SHIPPED | OUTPATIENT
Start: 2023-04-19 | End: 2023-05-19

## 2023-04-19 RX ORDER — SODIUM CHLORIDE 9 MG/ML
INJECTION, SOLUTION INTRAVENOUS CONTINUOUS PRN
Status: DISCONTINUED | OUTPATIENT
Start: 2023-04-19 | End: 2023-04-19 | Stop reason: SDUPTHER

## 2023-04-19 RX ORDER — DEXAMETHASONE SODIUM PHOSPHATE 10 MG/ML
INJECTION INTRAMUSCULAR; INTRAVENOUS PRN
Status: DISCONTINUED | OUTPATIENT
Start: 2023-04-19 | End: 2023-04-19 | Stop reason: SDUPTHER

## 2023-04-19 RX ORDER — MIDAZOLAM HYDROCHLORIDE 1 MG/ML
INJECTION INTRAMUSCULAR; INTRAVENOUS PRN
Status: DISCONTINUED | OUTPATIENT
Start: 2023-04-19 | End: 2023-04-19 | Stop reason: SDUPTHER

## 2023-04-19 RX ORDER — SODIUM CHLORIDE 0.9 % (FLUSH) 0.9 %
5-40 SYRINGE (ML) INJECTION EVERY 12 HOURS SCHEDULED
Status: DISCONTINUED | OUTPATIENT
Start: 2023-04-19 | End: 2023-04-24 | Stop reason: HOSPADM

## 2023-04-19 RX ORDER — DIPHENHYDRAMINE HYDROCHLORIDE 50 MG/ML
12.5 INJECTION INTRAMUSCULAR; INTRAVENOUS
Status: DISCONTINUED | OUTPATIENT
Start: 2023-04-19 | End: 2023-04-19 | Stop reason: HOSPADM

## 2023-04-19 RX ORDER — TOBRAMYCIN 1.2 G/30ML
INJECTION, POWDER, LYOPHILIZED, FOR SOLUTION INTRAVENOUS PRN
Status: DISCONTINUED | OUTPATIENT
Start: 2023-04-19 | End: 2023-04-19 | Stop reason: ALTCHOICE

## 2023-04-19 RX ORDER — OXYCODONE HYDROCHLORIDE AND ACETAMINOPHEN 5; 325 MG/1; MG/1
1 TABLET ORAL EVERY 6 HOURS PRN
Qty: 28 TABLET | Refills: 0 | Status: SHIPPED | OUTPATIENT
Start: 2023-04-19 | End: 2023-04-26

## 2023-04-19 RX ORDER — OXYCODONE HYDROCHLORIDE 5 MG/1
5 TABLET ORAL EVERY 4 HOURS PRN
Status: DISCONTINUED | OUTPATIENT
Start: 2023-04-19 | End: 2023-04-24 | Stop reason: HOSPADM

## 2023-04-19 RX ORDER — SODIUM CHLORIDE 0.9 % (FLUSH) 0.9 %
5-40 SYRINGE (ML) INJECTION PRN
Status: DISCONTINUED | OUTPATIENT
Start: 2023-04-19 | End: 2023-04-24 | Stop reason: HOSPADM

## 2023-04-19 RX ORDER — ONDANSETRON 2 MG/ML
INJECTION INTRAMUSCULAR; INTRAVENOUS PRN
Status: DISCONTINUED | OUTPATIENT
Start: 2023-04-19 | End: 2023-04-19 | Stop reason: SDUPTHER

## 2023-04-19 RX ORDER — ROCURONIUM BROMIDE 10 MG/ML
INJECTION, SOLUTION INTRAVENOUS PRN
Status: DISCONTINUED | OUTPATIENT
Start: 2023-04-19 | End: 2023-04-19 | Stop reason: SDUPTHER

## 2023-04-19 RX ORDER — ONDANSETRON 2 MG/ML
4 INJECTION INTRAMUSCULAR; INTRAVENOUS
Status: DISCONTINUED | OUTPATIENT
Start: 2023-04-19 | End: 2023-04-19 | Stop reason: HOSPADM

## 2023-04-19 RX ORDER — VANCOMYCIN HYDROCHLORIDE 1 G/20ML
INJECTION, POWDER, LYOPHILIZED, FOR SOLUTION INTRAVENOUS PRN
Status: DISCONTINUED | OUTPATIENT
Start: 2023-04-19 | End: 2023-04-19 | Stop reason: ALTCHOICE

## 2023-04-19 RX ORDER — KETOROLAC TROMETHAMINE 30 MG/ML
30 INJECTION, SOLUTION INTRAMUSCULAR; INTRAVENOUS
Status: DISCONTINUED | OUTPATIENT
Start: 2023-04-19 | End: 2023-04-19 | Stop reason: HOSPADM

## 2023-04-19 RX ORDER — PROPOFOL 10 MG/ML
INJECTION, EMULSION INTRAVENOUS PRN
Status: DISCONTINUED | OUTPATIENT
Start: 2023-04-19 | End: 2023-04-19 | Stop reason: SDUPTHER

## 2023-04-19 RX ORDER — MIDAZOLAM HYDROCHLORIDE 2 MG/2ML
2 INJECTION, SOLUTION INTRAMUSCULAR; INTRAVENOUS
Status: DISCONTINUED | OUTPATIENT
Start: 2023-04-19 | End: 2023-04-19 | Stop reason: HOSPADM

## 2023-04-19 RX ORDER — HYDROMORPHONE HCL 110MG/55ML
PATIENT CONTROLLED ANALGESIA SYRINGE INTRAVENOUS PRN
Status: DISCONTINUED | OUTPATIENT
Start: 2023-04-19 | End: 2023-04-19 | Stop reason: SDUPTHER

## 2023-04-19 RX ORDER — OXYCODONE HYDROCHLORIDE 10 MG/1
10 TABLET ORAL EVERY 4 HOURS PRN
Status: DISCONTINUED | OUTPATIENT
Start: 2023-04-19 | End: 2023-04-24 | Stop reason: HOSPADM

## 2023-04-19 RX ORDER — LABETALOL HYDROCHLORIDE 5 MG/ML
10 INJECTION, SOLUTION INTRAVENOUS
Status: DISCONTINUED | OUTPATIENT
Start: 2023-04-19 | End: 2023-04-19 | Stop reason: HOSPADM

## 2023-04-19 RX ORDER — MEPERIDINE HYDROCHLORIDE 25 MG/ML
12.5 INJECTION INTRAMUSCULAR; INTRAVENOUS; SUBCUTANEOUS EVERY 5 MIN PRN
Status: DISCONTINUED | OUTPATIENT
Start: 2023-04-19 | End: 2023-04-19 | Stop reason: HOSPADM

## 2023-04-19 RX ORDER — FENTANYL CITRATE 50 UG/ML
INJECTION, SOLUTION INTRAMUSCULAR; INTRAVENOUS PRN
Status: DISCONTINUED | OUTPATIENT
Start: 2023-04-19 | End: 2023-04-19 | Stop reason: SDUPTHER

## 2023-04-19 RX ORDER — SODIUM CHLORIDE 9 MG/ML
INJECTION, SOLUTION INTRAVENOUS PRN
Status: DISCONTINUED | OUTPATIENT
Start: 2023-04-19 | End: 2023-04-24 | Stop reason: HOSPADM

## 2023-04-19 RX ORDER — ACETAMINOPHEN 325 MG/1
650 TABLET ORAL EVERY 4 HOURS PRN
Status: DISCONTINUED | OUTPATIENT
Start: 2023-04-19 | End: 2023-04-24 | Stop reason: HOSPADM

## 2023-04-19 RX ORDER — HYDRALAZINE HYDROCHLORIDE 20 MG/ML
10 INJECTION INTRAMUSCULAR; INTRAVENOUS
Status: DISCONTINUED | OUTPATIENT
Start: 2023-04-19 | End: 2023-04-19 | Stop reason: HOSPADM

## 2023-04-19 RX ORDER — PROCHLORPERAZINE EDISYLATE 5 MG/ML
5 INJECTION INTRAMUSCULAR; INTRAVENOUS
Status: DISCONTINUED | OUTPATIENT
Start: 2023-04-19 | End: 2023-04-19 | Stop reason: HOSPADM

## 2023-04-19 RX ORDER — IPRATROPIUM BROMIDE AND ALBUTEROL SULFATE 2.5; .5 MG/3ML; MG/3ML
1 SOLUTION RESPIRATORY (INHALATION)
Status: DISCONTINUED | OUTPATIENT
Start: 2023-04-19 | End: 2023-04-19 | Stop reason: HOSPADM

## 2023-04-19 RX ORDER — ONDANSETRON 2 MG/ML
4 INJECTION INTRAMUSCULAR; INTRAVENOUS EVERY 6 HOURS PRN
Status: DISCONTINUED | OUTPATIENT
Start: 2023-04-19 | End: 2023-04-24 | Stop reason: HOSPADM

## 2023-04-19 RX ORDER — ONDANSETRON 4 MG/1
4 TABLET, ORALLY DISINTEGRATING ORAL EVERY 8 HOURS PRN
Status: DISCONTINUED | OUTPATIENT
Start: 2023-04-19 | End: 2023-04-24 | Stop reason: HOSPADM

## 2023-04-19 RX ORDER — LOSARTAN POTASSIUM 25 MG/1
25 TABLET ORAL DAILY
Status: DISCONTINUED | OUTPATIENT
Start: 2023-04-19 | End: 2023-04-24 | Stop reason: HOSPADM

## 2023-04-19 RX ORDER — SODIUM CHLORIDE, SODIUM LACTATE, POTASSIUM CHLORIDE, CALCIUM CHLORIDE 600; 310; 30; 20 MG/100ML; MG/100ML; MG/100ML; MG/100ML
INJECTION, SOLUTION INTRAVENOUS CONTINUOUS PRN
Status: DISCONTINUED | OUTPATIENT
Start: 2023-04-19 | End: 2023-04-19

## 2023-04-19 RX ORDER — MORPHINE SULFATE 2 MG/ML
2 INJECTION, SOLUTION INTRAMUSCULAR; INTRAVENOUS EVERY 5 MIN PRN
Status: DISCONTINUED | OUTPATIENT
Start: 2023-04-19 | End: 2023-04-19 | Stop reason: HOSPADM

## 2023-04-19 RX ORDER — CEFAZOLIN SODIUM 1 G/3ML
INJECTION, POWDER, FOR SOLUTION INTRAMUSCULAR; INTRAVENOUS PRN
Status: DISCONTINUED | OUTPATIENT
Start: 2023-04-19 | End: 2023-04-19 | Stop reason: SDUPTHER

## 2023-04-19 RX ORDER — LIDOCAINE HYDROCHLORIDE 20 MG/ML
INJECTION, SOLUTION INTRAVENOUS PRN
Status: DISCONTINUED | OUTPATIENT
Start: 2023-04-19 | End: 2023-04-19 | Stop reason: SDUPTHER

## 2023-04-19 RX ADMIN — FENTANYL CITRATE 100 MCG: 0.05 INJECTION, SOLUTION INTRAMUSCULAR; INTRAVENOUS at 11:30

## 2023-04-19 RX ADMIN — PROPOFOL 200 MG: 10 INJECTION, EMULSION INTRAVENOUS at 11:30

## 2023-04-19 RX ADMIN — LOSARTAN POTASSIUM 25 MG: 25 TABLET, FILM COATED ORAL at 22:19

## 2023-04-19 RX ADMIN — HYDROMORPHONE HYDROCHLORIDE 0.5 MG: 1 INJECTION, SOLUTION INTRAMUSCULAR; INTRAVENOUS; SUBCUTANEOUS at 13:42

## 2023-04-19 RX ADMIN — HYDROMORPHONE HYDROCHLORIDE 0.5 MG: 2 INJECTION, SOLUTION INTRAMUSCULAR; INTRAVENOUS; SUBCUTANEOUS at 13:21

## 2023-04-19 RX ADMIN — OXYCODONE AND ACETAMINOPHEN 1 TABLET: 5; 325 TABLET ORAL at 02:28

## 2023-04-19 RX ADMIN — FENTANYL CITRATE 50 MCG: 0.05 INJECTION, SOLUTION INTRAMUSCULAR; INTRAVENOUS at 11:48

## 2023-04-19 RX ADMIN — SODIUM CHLORIDE, PRESERVATIVE FREE 10 ML: 5 INJECTION INTRAVENOUS at 22:37

## 2023-04-19 RX ADMIN — FENTANYL CITRATE 50 MCG: 0.05 INJECTION, SOLUTION INTRAMUSCULAR; INTRAVENOUS at 12:20

## 2023-04-19 RX ADMIN — DEXAMETHASONE SODIUM PHOSPHATE 10 MG: 10 INJECTION INTRAMUSCULAR; INTRAVENOUS at 11:30

## 2023-04-19 RX ADMIN — SODIUM CHLORIDE: 9 INJECTION, SOLUTION INTRAVENOUS at 11:25

## 2023-04-19 RX ADMIN — ONDANSETRON 4 MG: 2 INJECTION INTRAMUSCULAR; INTRAVENOUS at 12:33

## 2023-04-19 RX ADMIN — SODIUM CHLORIDE: 9 INJECTION, SOLUTION INTRAVENOUS at 12:34

## 2023-04-19 RX ADMIN — MIDAZOLAM 2 MG: 1 INJECTION INTRAMUSCULAR; INTRAVENOUS at 11:25

## 2023-04-19 RX ADMIN — HYDROMORPHONE HYDROCHLORIDE 0.5 MG: 1 INJECTION, SOLUTION INTRAMUSCULAR; INTRAVENOUS; SUBCUTANEOUS at 13:51

## 2023-04-19 RX ADMIN — CEFAZOLIN 2000 MG: 2 INJECTION, POWDER, FOR SOLUTION INTRAMUSCULAR; INTRAVENOUS at 20:35

## 2023-04-19 RX ADMIN — LIDOCAINE HYDROCHLORIDE 100 MG: 20 INJECTION, SOLUTION INTRAVENOUS at 11:30

## 2023-04-19 RX ADMIN — HYDROMORPHONE HYDROCHLORIDE 0.5 MG: 2 INJECTION, SOLUTION INTRAMUSCULAR; INTRAVENOUS; SUBCUTANEOUS at 12:54

## 2023-04-19 RX ADMIN — ROCURONIUM BROMIDE 50 MG: 10 INJECTION, SOLUTION INTRAVENOUS at 11:30

## 2023-04-19 RX ADMIN — VANCOMYCIN HYDROCHLORIDE 1250 MG: 10 INJECTION, POWDER, LYOPHILIZED, FOR SOLUTION INTRAVENOUS at 22:18

## 2023-04-19 RX ADMIN — FENTANYL CITRATE 50 MCG: 0.05 INJECTION, SOLUTION INTRAMUSCULAR; INTRAVENOUS at 12:00

## 2023-04-19 RX ADMIN — HYDROMORPHONE HYDROCHLORIDE 0.5 MG: 2 INJECTION, SOLUTION INTRAMUSCULAR; INTRAVENOUS; SUBCUTANEOUS at 13:11

## 2023-04-19 RX ADMIN — OXYCODONE HYDROCHLORIDE 10 MG: 10 TABLET ORAL at 15:10

## 2023-04-19 RX ADMIN — ROCURONIUM BROMIDE 15 MG: 10 INJECTION, SOLUTION INTRAVENOUS at 12:20

## 2023-04-19 RX ADMIN — OXYCODONE 5 MG: 5 TABLET ORAL at 19:15

## 2023-04-19 RX ADMIN — SUGAMMADEX 240 MG: 100 INJECTION, SOLUTION INTRAVENOUS at 13:07

## 2023-04-19 RX ADMIN — ACETAMINOPHEN 650 MG: 325 TABLET ORAL at 17:00

## 2023-04-19 RX ADMIN — HYDROMORPHONE HYDROCHLORIDE 0.5 MG: 1 INJECTION, SOLUTION INTRAMUSCULAR; INTRAVENOUS; SUBCUTANEOUS at 13:30

## 2023-04-19 RX ADMIN — HYDROMORPHONE HYDROCHLORIDE 0.5 MG: 2 INJECTION, SOLUTION INTRAMUSCULAR; INTRAVENOUS; SUBCUTANEOUS at 12:50

## 2023-04-19 RX ADMIN — CEFAZOLIN 3000 MG: 10 INJECTION, POWDER, FOR SOLUTION INTRAVENOUS at 22:37

## 2023-04-19 RX ADMIN — HYDROMORPHONE HYDROCHLORIDE 0.5 MG: 1 INJECTION, SOLUTION INTRAMUSCULAR; INTRAVENOUS; SUBCUTANEOUS at 13:57

## 2023-04-19 RX ADMIN — CEFAZOLIN 2 G: 1 INJECTION, POWDER, FOR SOLUTION INTRAMUSCULAR; INTRAVENOUS at 11:42

## 2023-04-19 ASSESSMENT — PAIN - FUNCTIONAL ASSESSMENT
PAIN_FUNCTIONAL_ASSESSMENT: PREVENTS OR INTERFERES SOME ACTIVE ACTIVITIES AND ADLS

## 2023-04-19 ASSESSMENT — PAIN DESCRIPTION - ONSET
ONSET: ON-GOING

## 2023-04-19 ASSESSMENT — PAIN DESCRIPTION - FREQUENCY
FREQUENCY: CONTINUOUS

## 2023-04-19 ASSESSMENT — PAIN DESCRIPTION - PAIN TYPE
TYPE: ACUTE PAIN;SURGICAL PAIN

## 2023-04-19 ASSESSMENT — PAIN DESCRIPTION - DESCRIPTORS
DESCRIPTORS: ACHING;DISCOMFORT;DULL
DESCRIPTORS: ACHING;DISCOMFORT;DULL
DESCRIPTORS: ACHING;DISCOMFORT;THROBBING

## 2023-04-19 ASSESSMENT — PAIN DESCRIPTION - ORIENTATION
ORIENTATION: LEFT

## 2023-04-19 ASSESSMENT — PAIN SCALES - GENERAL
PAINLEVEL_OUTOF10: 7
PAINLEVEL_OUTOF10: 5
PAINLEVEL_OUTOF10: 7
PAINLEVEL_OUTOF10: 5
PAINLEVEL_OUTOF10: 5
PAINLEVEL_OUTOF10: 9
PAINLEVEL_OUTOF10: 4

## 2023-04-19 ASSESSMENT — PAIN DESCRIPTION - LOCATION
LOCATION: LEG

## 2023-04-19 ASSESSMENT — LIFESTYLE VARIABLES: SMOKING_STATUS: 0

## 2023-04-19 NOTE — ED NOTES
Patient medicated per orders, vitals as charted. Patients respirations are even and unlabored at this time. Patient waiting for a room at this time.       Shi Kingston RN  04/19/23 7829

## 2023-04-19 NOTE — ED NOTES
Patient sitting in wheelchair in waiting area, vitals retaken, patient updated on labs, diagnostic testing and room status. no s/sx of distress, zero complaints of pain or discomfort at this time. Will continue to monitor.       Jaimee Miller LPN  50/36/85 [id number]

## 2023-04-19 NOTE — ANESTHESIA PRE PROCEDURE
Department of Anesthesiology  Preprocedure Note       Name:  Anum Clifford   Age:  47 y.o.  :  1970                                          MRN:  [id number]         Date:  2023      Surgeon: Lisa Parikh):  Chloé Young MD    Procedure: Procedure(s):  LEFT KNEE IRRIGATION AND DEBRIDEMENT  LEFT TIBIA HARDWEAR REMOVAL, SYNTHES    Medications prior to admission:   Prior to Admission medications    Medication Sig Start Date End Date Taking? Authorizing Provider   calcium-vitamin D (OSCAL-500) 500-200 MG-UNIT per tablet Take 1 tablet by mouth 2 times daily 21   [facility]   zinc sulfate (ORAZINC) 220 (50 Zn) MG capsule Take 1 capsule by mouth daily 21   [facility]   Ascorbic Acid (VITAMIN C) 500 MG CHEW Take 500 mg by mouth daily 21   [facility]   aspirin EC 81 MG EC tablet Take 1 tablet by mouth 2 times daily 21   Alessia Mckeon DO       Current medications:    No current facility-administered medications for this encounter. Current Outpatient Medications   Medication Sig Dispense Refill    calcium-vitamin D (OSCAL-500) 500-200 MG-UNIT per tablet Take 1 tablet by mouth 2 times daily 60 tablet 2    zinc sulfate (ORAZINC) 220 (50 Zn) MG capsule Take 1 capsule by mouth daily 30 capsule 2    Ascorbic Acid (VITAMIN C) 500 MG CHEW Take 500 mg by mouth daily 90 tablet 0    aspirin EC 81 MG EC tablet Take 1 tablet by mouth 2 times daily 30 tablet 0       Allergies:  No Known Allergies    Problem List:    Patient Active Problem List   Diagnosis Code    Tibial plateau fracture, left, closed, initial encounter O84.242P       Past Medical History:  History reviewed. No pertinent past medical history.     Past Surgical History:        Procedure Laterality Date    FRACTURE SURGERY      Left tibal    HERNIA REPAIR      LEG SURGERY Left 2021    LEG EXTERNAL FIXATOR APPLICATION, LEFT performed by Josie Cueva MD at [place] [id number]

## 2023-04-19 NOTE — OP NOTE
Operative Note      Patient: Ihsan Mckeon  YOB: 1970  MRN: [id number]    Date of Procedure: 4/19/2023    Pre-Op Diagnosis Codes: * Pyogenic arthritis of left knee joint, due to unspecified organism ([facility] [address] [M00.9], Niharika-implant infection left knee. Post-Op Diagnosis: Same       Procedure(s):  LEFT KNEE IRRIGATION AND DEBRIDEMENT, LEFT TIBIA HARDWARE REMOVAL, debridement of infection of the bone proximal tibia with saucerization and insertion of antibiotic delivery device. Surgeon(s):  Maryam Armstrong MD    Assistant:   Resident: Abel Reynaga DO; Chloe Thompson DO    Anesthesia: General    Estimated Blood Loss (mL): 601RQ    Complications: None    Specimens:   ID Type Source Tests Collected by Time Destination   1 : LEFT SUPERFICIAL LEG Tissue Tissue CULTURE, ANAEROBIC, CULTURE, FUNGUS, GRAM STAIN, CULTURE, Ul. Raisa Webster MD 4/19/2023 1200    2 : LEFT SUPERFICIAL LEG #2 Tissue Tissue CULTURE, ANAEROBIC, CULTURE, FUNGUS, GRAM STAIN, CULTURE, SURGICAL Maryam Armstrong MD 4/19/2023 1205    3 : LEFT LEG DEEP Tissue Tissue CULTURE, ANAEROBIC, CULTURE, FUNGUS, GRAM STAIN, CULTURE, Ul. Raisa Webster MD 4/19/2023 1219    4 : BONE LEFT TIBIA Tissue Tissue CULTURE, ANAEROBIC, CULTURE, FUNGUS, GRAM STAIN, CULTURE, SURGICAL Maryam Armstrong MD 4/19/2023 1220    A : HARDWARE FROM LEFT TIBIA Hardware Hardware SURGICAL PATHOLOGY Maryam Armstrong MD 4/19/2023 1217        Implants:  Implant Name Type Inv. Item Serial No.  Lot No. LRB No. Used Action   KIT BNE GRFT SUB 5CC CA SULF FAST CURE RESRB MINI BEAD - [id number]  KIT BNE GRFT SUB 5CC CA SULF FAST CURE RESRB MINI BEAD  [employer] [facility]- [id number] Left 1 Implanted         Drains: * No LDAs found *    Findings: Gross purulence to the left knee joint as well as surrounding hardware and cavitation about the metaphysis of the proximal tibia.       Detailed

## 2023-04-19 NOTE — ED NOTES
Labs drawn/sent. Additional IV placed right AC. Pt signed procedural consent form. Pt to Xray for pre-op CXR, then will go directly to surgery after xray. Belongings sent with pt as well as chart, to xray. 1 blue bag, 1 bag with shoes. Pt wearing shirt/pants.       Nancy Martinez RN  04/19/23 0453

## 2023-04-19 NOTE — ED PROVIDER NOTES
HPI:  4/19/23, Time: 6:33 AM EDT         Zenobia Bowers is a 47 y.o. male presenting to the ED for     Review of Systems:   A complete review of systems was performed and pertinent positives and negatives are stated within HPI, all other systems reviewed and are negative.          --------------------------------------------- PAST HISTORY ---------------------------------------------  Past Medical History:  has no past medical history on file. Past Surgical History:  has a past surgical history that includes Leg Surgery (Left, 6/6/2021); Tonsillectomy (1977); hernia repair; fracture surgery; and Leg Surgery (Left, 6/24/2021). Social History:  reports that he has never smoked. His smokeless tobacco use includes chew. He reports current alcohol use of about 12.0 standard drinks per week. He reports that he does not use drugs. Family History: family history is not on file. The patients home medications have been reviewed. Allergies: Patient has no known allergies.     -------------------------------------------------- RESULTS -------------------------------------------------  All laboratory and radiology results have been personally reviewed by myself   LABS:  Results for orders placed or performed during the hospital encounter of 04/19/23   CBC with Auto Differential   Result Value Ref Range    WBC 9.3 4.5 - 11.5 E9/L    RBC 4.61 3.80 - 5.80 E12/L    Hemoglobin 13.0 12.5 - 16.5 g/dL    Hematocrit 39.8 37.0 - 54.0 %    MCV 86.3 80.0 - 99.9 fL    MCH 28.2 26.0 - 35.0 pg    MCHC 32.7 32.0 - 34.5 %    RDW 12.6 11.5 - 15.0 fL    Platelets 731 (H) 869 - 450 E9/L    MPV 9.2 7.0 - 12.0 fL    Neutrophils % 67.1 43.0 - 80.0 %    Immature Granulocytes % 0.2 0.0 - 5.0 %    Lymphocytes % 17.4 (L) 20.0 - 42.0 %    Monocytes % 8.3 2.0 - 12.0 %    Eosinophils % 5.9 0.0 - 6.0 %    Basophils % 1.1 0.0 - 2.0 %    Neutrophils Absolute 6.21 1.80 - 7.30 E9/L    Immature Granulocytes # 0.02 E9/L    Lymphocytes

## 2023-04-20 LAB
BACTERIA BLD CULT ORG #2: NORMAL
GRAM STAIN ORDERABLE: NORMAL

## 2023-04-20 PROCEDURE — 6360000002 HC RX W HCPCS: Performed by: INTERNAL MEDICINE

## 2023-04-20 PROCEDURE — 97165 OT EVAL LOW COMPLEX 30 MIN: CPT

## 2023-04-20 PROCEDURE — 2580000003 HC RX 258: Performed by: INTERNAL MEDICINE

## 2023-04-20 PROCEDURE — 97535 SELF CARE MNGMENT TRAINING: CPT

## 2023-04-20 PROCEDURE — C1751 CATH, INF, PER/CENT/MIDLINE: HCPCS

## 2023-04-20 PROCEDURE — 6370000000 HC RX 637 (ALT 250 FOR IP): Performed by: FAMILY MEDICINE

## 2023-04-20 PROCEDURE — 76937 US GUIDE VASCULAR ACCESS: CPT

## 2023-04-20 PROCEDURE — 1200000000 HC SEMI PRIVATE

## 2023-04-20 PROCEDURE — 2580000003 HC RX 258: Performed by: FAMILY MEDICINE

## 2023-04-20 PROCEDURE — 97161 PT EVAL LOW COMPLEX 20 MIN: CPT

## 2023-04-20 PROCEDURE — 36569 INSJ PICC 5 YR+ W/O IMAGING: CPT

## 2023-04-20 PROCEDURE — 6360000002 HC RX W HCPCS: Performed by: FAMILY MEDICINE

## 2023-04-20 PROCEDURE — 97530 THERAPEUTIC ACTIVITIES: CPT

## 2023-04-20 RX ORDER — HEPARIN SODIUM (PORCINE) LOCK FLUSH IV SOLN 100 UNIT/ML 100 UNIT/ML
3 SOLUTION INTRAVENOUS PRN
Status: DISCONTINUED | OUTPATIENT
Start: 2023-04-20 | End: 2023-04-24 | Stop reason: HOSPADM

## 2023-04-20 RX ORDER — HEPARIN SODIUM (PORCINE) LOCK FLUSH IV SOLN 100 UNIT/ML 100 UNIT/ML
3 SOLUTION INTRAVENOUS EVERY 12 HOURS SCHEDULED
Status: DISCONTINUED | OUTPATIENT
Start: 2023-04-20 | End: 2023-04-24 | Stop reason: HOSPADM

## 2023-04-20 RX ORDER — SODIUM CHLORIDE 9 MG/ML
INJECTION, SOLUTION INTRAVENOUS PRN
Status: DISCONTINUED | OUTPATIENT
Start: 2023-04-20 | End: 2023-04-24 | Stop reason: HOSPADM

## 2023-04-20 RX ORDER — SODIUM CHLORIDE 0.9 % (FLUSH) 0.9 %
5-40 SYRINGE (ML) INJECTION PRN
Status: DISCONTINUED | OUTPATIENT
Start: 2023-04-20 | End: 2023-04-24 | Stop reason: HOSPADM

## 2023-04-20 RX ORDER — LIDOCAINE HYDROCHLORIDE 10 MG/ML
5 INJECTION, SOLUTION EPIDURAL; INFILTRATION; INTRACAUDAL; PERINEURAL ONCE
Status: DISCONTINUED | OUTPATIENT
Start: 2023-04-20 | End: 2023-04-24 | Stop reason: HOSPADM

## 2023-04-20 RX ORDER — SODIUM CHLORIDE 0.9 % (FLUSH) 0.9 %
5-40 SYRINGE (ML) INJECTION EVERY 12 HOURS SCHEDULED
Status: DISCONTINUED | OUTPATIENT
Start: 2023-04-20 | End: 2023-04-24 | Stop reason: HOSPADM

## 2023-04-20 RX ADMIN — ACETAMINOPHEN 650 MG: 325 TABLET ORAL at 16:08

## 2023-04-20 RX ADMIN — ASPIRIN 325 MG: 325 TABLET, COATED ORAL at 08:54

## 2023-04-20 RX ADMIN — HEPARIN 300 UNITS: 100 SYRINGE at 22:45

## 2023-04-20 RX ADMIN — HEPARIN 300 UNITS: 100 SYRINGE at 22:50

## 2023-04-20 RX ADMIN — SODIUM CHLORIDE, PRESERVATIVE FREE 10 ML: 5 INJECTION INTRAVENOUS at 22:49

## 2023-04-20 RX ADMIN — VANCOMYCIN HYDROCHLORIDE 1250 MG: 10 INJECTION, POWDER, LYOPHILIZED, FOR SOLUTION INTRAVENOUS at 22:49

## 2023-04-20 RX ADMIN — HEPARIN 300 UNITS: 100 SYRINGE at 13:43

## 2023-04-20 RX ADMIN — CEFAZOLIN 3000 MG: 10 INJECTION, POWDER, FOR SOLUTION INTRAVENOUS at 04:47

## 2023-04-20 RX ADMIN — OXYCODONE 5 MG: 5 TABLET ORAL at 13:40

## 2023-04-20 RX ADMIN — OXYCODONE 5 MG: 5 TABLET ORAL at 07:41

## 2023-04-20 RX ADMIN — VANCOMYCIN HYDROCHLORIDE 1250 MG: 10 INJECTION, POWDER, LYOPHILIZED, FOR SOLUTION INTRAVENOUS at 09:00

## 2023-04-20 RX ADMIN — ASPIRIN 325 MG: 325 TABLET, COATED ORAL at 22:45

## 2023-04-20 RX ADMIN — OXYCODONE HYDROCHLORIDE 10 MG: 10 TABLET ORAL at 18:03

## 2023-04-20 RX ADMIN — LOSARTAN POTASSIUM 25 MG: 25 TABLET, FILM COATED ORAL at 08:54

## 2023-04-20 RX ADMIN — CEFEPIME 2000 MG: 2 INJECTION, POWDER, FOR SOLUTION INTRAVENOUS at 13:30

## 2023-04-20 ASSESSMENT — PAIN DESCRIPTION - PAIN TYPE
TYPE: SURGICAL PAIN

## 2023-04-20 ASSESSMENT — PAIN DESCRIPTION - ONSET
ONSET: GRADUAL
ONSET: ON-GOING
ONSET: GRADUAL

## 2023-04-20 ASSESSMENT — PAIN - FUNCTIONAL ASSESSMENT
PAIN_FUNCTIONAL_ASSESSMENT: PREVENTS OR INTERFERES SOME ACTIVE ACTIVITIES AND ADLS

## 2023-04-20 ASSESSMENT — PAIN SCALES - GENERAL
PAINLEVEL_OUTOF10: 8
PAINLEVEL_OUTOF10: 3
PAINLEVEL_OUTOF10: 3
PAINLEVEL_OUTOF10: 0
PAINLEVEL_OUTOF10: 4
PAINLEVEL_OUTOF10: 0
PAINLEVEL_OUTOF10: 2

## 2023-04-20 ASSESSMENT — PAIN DESCRIPTION - ORIENTATION
ORIENTATION: LEFT

## 2023-04-20 ASSESSMENT — PAIN DESCRIPTION - FREQUENCY
FREQUENCY: INTERMITTENT
FREQUENCY: INTERMITTENT
FREQUENCY: CONTINUOUS

## 2023-04-20 ASSESSMENT — PAIN DESCRIPTION - DESCRIPTORS
DESCRIPTORS: ACHING
DESCRIPTORS: ACHING;SORE
DESCRIPTORS: ACHING;DISCOMFORT;SORE

## 2023-04-20 ASSESSMENT — PAIN DESCRIPTION - LOCATION
LOCATION: KNEE
LOCATION: LEG
LOCATION: LEG

## 2023-04-20 NOTE — ANESTHESIA POSTPROCEDURE EVALUATION
Department of Anesthesiology  Postprocedure Note    Patient: Omayra Holliday  MRN: [id number]  YOB: 1970  Date of evaluation: 4/20/2023      Procedure Summary     Date: 04/19/23 [facility]    Anesthesia Start: 1125 Anesthesia Stop: 1326    Procedure: LEFT KNEE IRRIGATION AND DEBRIDEMENT, LEFT TIBIA HARDWARE REMOVAL (Left: Knee) Diagnosis:       Pyogenic arthritis of left knee joint, due to unspecified organism [facility]      (Pyogenic arthritis of left knee joint, due to unspecified organism [facility]) [M00.9])    Surgeons: Rohan Najera MD Responsible Provider: Ubaldo Johnson MD    Anesthesia Type: general ASA Status: 2          Anesthesia Type: No value filed.     Ayanna Phase I: Ayanna Score: 10    Yaanna Phase II:        Anesthesia Post Evaluation    Patient location during evaluation: PACU  Patient participation: complete - patient participated  Level of consciousness: awake  Airway patency: patent  Nausea & Vomiting: no nausea and no vomiting  Complications: no  Cardiovascular status: hemodynamically stable  Respiratory status: acceptable  Hydration status: euvolemic

## 2023-04-20 NOTE — HOME CARE
RECEIVED REFERRAL FOR POSSIBLE IV AT DISCHARGE. UPDATED  H THAT NEXT AVAILABLE SOC IS Tuesday OF NEXT WEEK.  WILL NEED SIGNED SCRIPTS AND SIGNED PHARMACY CONSENT FORM     Eric Terrazas [facility]

## 2023-04-20 NOTE — CONSULTS
allergies. Social History:   TOBACCO:   reports that he has never smoked. His smokeless tobacco use includes chew. ETOH:   reports current alcohol use of about 12.0 standard drinks per week. DRUGS:   reports no history of drug use. ACTIVITIES OF DAILY LIVING:    OCCUPATION:    Family History:   History reviewed. No pertinent family history. REVIEW OF SYSTEMS:  CONSTITUTIONAL:  negative for  fevers, chills  EYES:  negative for blurred vision, visual disturbance  HEENT:  negative for  hearing loss, voice change  RESPIRATORY:  negative for  dyspnea, wheezing  CARDIOVASCULAR:  negative for  chest pain, palpitations  GASTROINTESTINAL:  negative for nausea, vomiting  GENITOURINARY:  negative for frequency, urinary incontinence  HEMATOLOGIC/LYMPHATIC:  negative for bleeding and petechiae  MUSCULOSKELETAL:  positive for  pain, swelling to the left knee  NEUROLOGICAL:  negative for headaches, dizziness  BEHAVIOR/PSYCH:  negative for increased agitation and anxiety    PHYSICAL EXAM:    VITALS:  BP (!) 161/96   Pulse 60   Temp 98.7 °F (37.1 °C) (Oral)   Resp 18   Wt 265 lb (120.2 kg)   SpO2 98%   BMI 29.85 kg/m²   CONSTITUTIONAL:  awake, alert, cooperative, no apparent distress, and appears stated age  General appearance:  No apparent distress, appears stated age. HEENT:  Normal cephalic, atraumatic without obvious deformity. Pupils equal, round, and reactive to light. Extra ocular muscles intact. Conjunctivae/corneas clear. Neck: Supple, with full range of motion. No jugular venous distention. Trachea midline. Respiratory:  Normal respiratory effort. Clear to auscultation,   Cardiovascular:  RRR  Abdomen: Soft, non-tender, non-distended with normal bowel sounds.    Skin: Please see exam below  Neurologic:   Cranial nerves: II-XII intact,   Psychiatric:  Alert and oriented x 3   MUSCULOSKELETAL:  Left lower Extremity:  Lateral incision of the leg is healed appropriately, there is erythema noted inferior to
mouth 2 times daily 7/13/21   Danay Patel PA-C   zinc sulfate (ORAZINC) 220 (50 Zn) MG capsule Take 1 capsule by mouth daily 7/13/21   Danay Patel PA-C   Ascorbic Acid (VITAMIN C) 500 MG CHEW Take 500 mg by mouth daily 7/13/21   Danay Patel PA-C       Allergies:  Patient has no known allergies. Social History:      TOBACCO:   reports that he has never smoked. His smokeless tobacco use includes chew. ETOH:   reports current alcohol use of about 12.0 standard drinks per week. Family History:     Reviewed in detail and negative for DM, CAD, Cancer, CVA. Positive as follows:    History reviewed. No pertinent family history. REVIEW OF SYSTEMS:   Pertinent positives as noted in the HPI. All other systems reviewed and negative. PHYSICAL EXAM:  BP (!) 165/109   Pulse 78   Temp 97.4 °F (36.3 °C) (Temporal)   Resp 18   Ht 6' 7\" (2.007 m)   Wt 265 lb (120.2 kg)   SpO2 100%   BMI 29.85 kg/m²   General appearance: No apparent distress, appears stated age and cooperative. HEENT: Normal cephalic, atraumatic without obvious deformity. Pupils equal, round, and reactive to light. Extra ocular muscles intact. Conjunctivae/corneas clear. Neck: Supple, with full range of motion. No jugular venous distention. Trachea midline. Respiratory:  Normal respiratory effort. Clear to auscultation, bilaterally without crackles or rhonchi  Cardiovascular: Regular rate and rhythm  Abdomen: Soft, non-tender, non-distended with normal bowel sounds. Musculoskeletal: No clubbing, cyanosis or edema bilaterally. Skin: Skin color, texture, turgor normal.  No rashes or lesions. Neurologic:  Neurovascularly intact without any focal sensory/motor deficits.  Cranial nerves: II-XII intact, grossly non-focal.  Psychiatric: Alert and oriented, thought content appropriate, normal insight    Labs:     CBC:   Recent Labs     04/18/23  1518 04/19/23  0922   WBC 9.3 9.3   RBC 4.61 4.46   HGB 13.0 12.6   HCT
EOSABS 0.55 04/18/2023 03:18 PM    BASOSABS 0.10 04/18/2023 03:18 PM       CMP     Lab Results   Component Value Date/Time     04/19/2023 09:22 AM    K 4.3 04/19/2023 09:22 AM    K 3.9 06/25/2021 05:38 AM     04/19/2023 09:22 AM    CO2 29 04/19/2023 09:22 AM    BUN 12 04/19/2023 09:22 AM    CREATININE 0.9 04/19/2023 09:22 AM    GFRAA >60 06/25/2021 05:38 AM    LABGLOM >60 04/19/2023 09:22 AM    GLUCOSE 109 04/19/2023 09:22 AM    PROT 8.1 04/19/2023 09:22 AM    LABALBU 3.5 04/19/2023 09:22 AM    CALCIUM 9.6 04/19/2023 09:22 AM    BILITOT 0.5 04/19/2023 09:22 AM    ALKPHOS 114 04/19/2023 09:22 AM    AST 10 04/19/2023 09:22 AM    ALT 11 04/19/2023 09:22 AM       MICROBIOLOGY:        Cell Count Fluid Type  Synovial    Color, Fluid  Yellow    Appearance, Fluid  Cloudy    Nucl Cell, Fluid /uL 138,000    RBC, Fluid /uL 15,000    Neutrophil Count, Fluid % 92    Comment: Neutrophils, Body Fluid = Polymorphonuclear Leukocytes   Monocyte Count, Fluid % 8    Comment: Monocyte Count, Fluid includes all Mononuclear cells.      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

## 2023-04-20 NOTE — CARE COORDINATION
4/20/2023 social work transition of care planning  Pt is from home alone,has a friend that lives upstairs. Pt did not report having ap cp,but pharmacy is [address] [facility] on [address]. Pt reported that he has needed dme at home. Explained sw role in transition of care planning. Pt plan is home,pt has transport. Sw left hhc list,if needed for iv atb only. The Plan for Transition of Care is related to the following treatment goals: possible iv therapy    The Patient and/or patient representative  was provided with a choice of provider and agrees   with the discharge plan. [x] Yes [] No    Freedom of choice list was provided with basic dialogue that supports the patient's individualized plan of care/goals, treatment preferences and shares the quality data associated with the providers. [x] Yes [] No  Electronically signed by ESME Pedroza on 4/20/2023 at 8:53 AM    Addendum:Sw followed up with pt for hhc choice,due to need for iv atb. Referral to [facility] Tuesday). Sw discussed infusion 9at main) if q 24 is final poc. Brittany/matt following.   Electronically signed by ESME Pedroza on 4/20/2023 at 11:14 AM

## 2023-04-21 LAB
A BAUMANNII DNA BLD POS QL NAA+NON-PROBE: NOT DETECTED
BACTERIA SPEC AEROBE CULT: ABNORMAL
BACTERIA SPEC ANAEROBE CULT: NORMAL
BACTERIA WND AEROBE CULT: ABNORMAL
BOTTLE TYPE: NORMAL
C ALBICANS DNA BLD POS QL NAA+NON-PROBE: NOT DETECTED
C AURIS DNA BLD POS QL NAA+PROBE: NOT DETECTED
C GLABRATA DNA BLD POS QL NAA+NON-PROBE: NOT DETECTED
C KRUSEI DNA BLD POS QL NAA+NON-PROBE: NOT DETECTED
C PARAP DNA BLD POS QL NAA+NON-PROBE: NOT DETECTED
C TROPICLS DNA BLD POS QL NAA+NON-PROBE: NOT DETECTED
CRYPTOCOCCUS NEOFORMANS/GATTII BY PCR: NOT DETECTED
E CLOAC COMP DNA BLD POS NAA+NON-PROBE: NOT DETECTED
E COLI DNA BLD POS QL NAA+NON-PROBE: NOT DETECTED
E FAECALIS DNA BLD POS QL NAA+PROBE: NOT DETECTED
E FAECIUM DNA BLD POS QL NAA+PROBE: NOT DETECTED
ENTEROBACT DNA BLD POS QL NAA+NON-PROBE: NOT DETECTED
ENTEROCOC DNA BLD POS QL NAA+NON-PROBE: NOT DETECTED
GN BLD CULTURE PNL BLD POS NAA+PROBE: NOT DETECTED
GRAM STN SPEC: ABNORMAL
K OXYTOCA DNA BLD POS QL NAA+NON-PROBE: NOT DETECTED
K PNEUMON DNA SPEC QL NAA+PROBE: NOT DETECTED
K. AEROGENES DNA SPEC QL NAA+PROBE: NOT DETECTED
L MONOCYTOG DNA BLD POS QL NAA+NON-PROBE: NOT DETECTED
N MEN DNA BLD POS QL NAA+NON-PROBE: NOT DETECTED
ORDER NUMBER: NORMAL
ORGANISM: ABNORMAL
P AERUGINOSA DNA BLD POS NAA+NON-PROBE: NOT DETECTED
PROTEUS SP DNA BLD POS QL NAA+NON-PROBE: NOT DETECTED
S AUREUS DNA BLD POS QL NAA+NON-PROBE: NOT DETECTED
S AUREUS+CONS DNA BLD POS NAA+NON-PROBE: NOT DETECTED
S EPIDERMIDIS DNA BLD POS QL NAA+PROBE: NOT DETECTED
S LUGDUNENSIS DNA BLD POS QL NAA+PROBE: NOT DETECTED
S MALTOPH DNA BLD POS QL NAA+PROBE: NOT DETECTED
S MARCESCENS DNA BLD POS NAA+NON-PROBE: NOT DETECTED
S PNEUM DNA BLD POS QL NAA+NON-PROBE: NOT DETECTED
S PYO DNA BLD POS QL NAA+NON-PROBE: NOT DETECTED
SALMONELLA DNA BLD POS QL NAA+PROBE: NOT DETECTED
SOURCE OF BLOOD CULTURE: NORMAL
STREPTOCOCCUS AGALACTIAE BY PCR: NOT DETECTED
STREPTOCOCCUS DNA BLD POS NAA+NON-PROBE: NOT DETECTED

## 2023-04-21 PROCEDURE — 97535 SELF CARE MNGMENT TRAINING: CPT

## 2023-04-21 PROCEDURE — 6360000002 HC RX W HCPCS: Performed by: INTERNAL MEDICINE

## 2023-04-21 PROCEDURE — 6370000000 HC RX 637 (ALT 250 FOR IP): Performed by: FAMILY MEDICINE

## 2023-04-21 PROCEDURE — 2580000003 HC RX 258: Performed by: INTERNAL MEDICINE

## 2023-04-21 PROCEDURE — 1200000000 HC SEMI PRIVATE

## 2023-04-21 PROCEDURE — 97530 THERAPEUTIC ACTIVITIES: CPT

## 2023-04-21 RX ORDER — AMPICILLIN AND SULBACTAM 2; 1 G/1; G/1
3000 INJECTION, POWDER, FOR SOLUTION INTRAMUSCULAR; INTRAVENOUS EVERY 6 HOURS
Qty: 40 EACH | Refills: 0 | Status: SHIPPED | OUTPATIENT
Start: 2023-04-21 | End: 2023-06-02

## 2023-04-21 RX ADMIN — ASPIRIN 325 MG: 325 TABLET, COATED ORAL at 20:12

## 2023-04-21 RX ADMIN — LOSARTAN POTASSIUM 25 MG: 25 TABLET, FILM COATED ORAL at 09:35

## 2023-04-21 RX ADMIN — SODIUM CHLORIDE, PRESERVATIVE FREE 10 ML: 5 INJECTION INTRAVENOUS at 20:13

## 2023-04-21 RX ADMIN — CEFEPIME 2000 MG: 2 INJECTION, POWDER, FOR SOLUTION INTRAVENOUS at 02:12

## 2023-04-21 RX ADMIN — OXYCODONE HYDROCHLORIDE 10 MG: 10 TABLET ORAL at 11:30

## 2023-04-21 RX ADMIN — OXYCODONE HYDROCHLORIDE 10 MG: 10 TABLET ORAL at 18:41

## 2023-04-21 RX ADMIN — ASPIRIN 325 MG: 325 TABLET, COATED ORAL at 09:35

## 2023-04-21 RX ADMIN — VANCOMYCIN HYDROCHLORIDE 1250 MG: 10 INJECTION, POWDER, LYOPHILIZED, FOR SOLUTION INTRAVENOUS at 09:38

## 2023-04-21 RX ADMIN — SODIUM CHLORIDE, PRESERVATIVE FREE 10 ML: 5 INJECTION INTRAVENOUS at 09:34

## 2023-04-21 RX ADMIN — HEPARIN 300 UNITS: 100 SYRINGE at 20:12

## 2023-04-21 RX ADMIN — AMPICILLIN SODIUM AND SULBACTAM SODIUM 3000 MG: 2; 1 INJECTION, POWDER, FOR SOLUTION INTRAMUSCULAR; INTRAVENOUS at 14:26

## 2023-04-21 RX ADMIN — AMPICILLIN SODIUM AND SULBACTAM SODIUM 3000 MG: 2; 1 INJECTION, POWDER, FOR SOLUTION INTRAMUSCULAR; INTRAVENOUS at 20:13

## 2023-04-21 RX ADMIN — HEPARIN 300 UNITS: 100 SYRINGE at 09:34

## 2023-04-21 ASSESSMENT — PAIN DESCRIPTION - DESCRIPTORS
DESCRIPTORS: ACHING;CRAMPING;DISCOMFORT
DESCRIPTORS: ACHING;SPASM;THROBBING

## 2023-04-21 ASSESSMENT — PAIN - FUNCTIONAL ASSESSMENT
PAIN_FUNCTIONAL_ASSESSMENT: PREVENTS OR INTERFERES SOME ACTIVE ACTIVITIES AND ADLS
PAIN_FUNCTIONAL_ASSESSMENT: ACTIVITIES ARE NOT PREVENTED

## 2023-04-21 ASSESSMENT — PAIN SCALES - GENERAL
PAINLEVEL_OUTOF10: 9
PAINLEVEL_OUTOF10: 0
PAINLEVEL_OUTOF10: 7

## 2023-04-21 ASSESSMENT — PAIN DESCRIPTION - ORIENTATION
ORIENTATION: LEFT
ORIENTATION: RIGHT

## 2023-04-21 ASSESSMENT — PAIN DESCRIPTION - LOCATION
LOCATION: KNEE
LOCATION: KNEE

## 2023-04-21 NOTE — CARE COORDINATION
4/21. Final ID rec- Unasyn 3G q 6H x 6 weeks. Prescription faxed to [phone]. Notified [facility] ([facility]) home care. They will check on pricing.  Yesika Subramanian RN

## 2023-04-21 NOTE — CARE COORDINATION
4/21. Discharge plan is home with [facility]. First day of service is Tuesday, 4/25, possibly Monday, 4/24. The liaison reviewed the cost with the pt and he is in agreement with this amount.  Norma Page RN

## 2023-04-22 LAB
BACTERIA BLD CULT: ABNORMAL
BACTERIA BLD CULT: ABNORMAL
BACTERIA SPEC ANAEROBE CULT: NORMAL
LOEFFLER MB STN SPEC: NORMAL
ORGANISM: ABNORMAL

## 2023-04-22 PROCEDURE — 2580000003 HC RX 258: Performed by: FAMILY MEDICINE

## 2023-04-22 PROCEDURE — 2580000003 HC RX 258: Performed by: INTERNAL MEDICINE

## 2023-04-22 PROCEDURE — 6360000002 HC RX W HCPCS: Performed by: INTERNAL MEDICINE

## 2023-04-22 PROCEDURE — 6370000000 HC RX 637 (ALT 250 FOR IP): Performed by: FAMILY MEDICINE

## 2023-04-22 PROCEDURE — 1200000000 HC SEMI PRIVATE

## 2023-04-22 RX ADMIN — SODIUM CHLORIDE, PRESERVATIVE FREE 10 ML: 5 INJECTION INTRAVENOUS at 08:46

## 2023-04-22 RX ADMIN — SODIUM CHLORIDE, PRESERVATIVE FREE 10 ML: 5 INJECTION INTRAVENOUS at 20:25

## 2023-04-22 RX ADMIN — OXYCODONE HYDROCHLORIDE 10 MG: 10 TABLET ORAL at 14:59

## 2023-04-22 RX ADMIN — LOSARTAN POTASSIUM 25 MG: 25 TABLET, FILM COATED ORAL at 08:47

## 2023-04-22 RX ADMIN — OXYCODONE HYDROCHLORIDE 10 MG: 10 TABLET ORAL at 20:31

## 2023-04-22 RX ADMIN — AMPICILLIN SODIUM AND SULBACTAM SODIUM 3000 MG: 2; 1 INJECTION, POWDER, FOR SOLUTION INTRAMUSCULAR; INTRAVENOUS at 03:28

## 2023-04-22 RX ADMIN — ASPIRIN 325 MG: 325 TABLET, COATED ORAL at 08:47

## 2023-04-22 RX ADMIN — AMPICILLIN SODIUM AND SULBACTAM SODIUM 3000 MG: 2; 1 INJECTION, POWDER, FOR SOLUTION INTRAMUSCULAR; INTRAVENOUS at 20:27

## 2023-04-22 RX ADMIN — SODIUM CHLORIDE, PRESERVATIVE FREE 10 ML: 5 INJECTION INTRAVENOUS at 09:23

## 2023-04-22 RX ADMIN — HEPARIN 300 UNITS: 100 SYRINGE at 08:46

## 2023-04-22 RX ADMIN — HEPARIN 300 UNITS: 100 SYRINGE at 20:25

## 2023-04-22 RX ADMIN — AMPICILLIN SODIUM AND SULBACTAM SODIUM 3000 MG: 2; 1 INJECTION, POWDER, FOR SOLUTION INTRAMUSCULAR; INTRAVENOUS at 14:57

## 2023-04-22 RX ADMIN — ASPIRIN 325 MG: 325 TABLET, COATED ORAL at 20:24

## 2023-04-22 RX ADMIN — AMPICILLIN SODIUM AND SULBACTAM SODIUM 3000 MG: 2; 1 INJECTION, POWDER, FOR SOLUTION INTRAMUSCULAR; INTRAVENOUS at 08:46

## 2023-04-22 ASSESSMENT — PAIN DESCRIPTION - PAIN TYPE
TYPE: SURGICAL PAIN
TYPE: SURGICAL PAIN

## 2023-04-22 ASSESSMENT — PAIN - FUNCTIONAL ASSESSMENT
PAIN_FUNCTIONAL_ASSESSMENT: PREVENTS OR INTERFERES SOME ACTIVE ACTIVITIES AND ADLS
PAIN_FUNCTIONAL_ASSESSMENT: PREVENTS OR INTERFERES SOME ACTIVE ACTIVITIES AND ADLS

## 2023-04-22 ASSESSMENT — PAIN SCALES - GENERAL
PAINLEVEL_OUTOF10: 0
PAINLEVEL_OUTOF10: 3
PAINLEVEL_OUTOF10: 4
PAINLEVEL_OUTOF10: 7
PAINLEVEL_OUTOF10: 2
PAINLEVEL_OUTOF10: 7

## 2023-04-22 ASSESSMENT — PAIN DESCRIPTION - LOCATION
LOCATION: KNEE
LOCATION: KNEE

## 2023-04-22 ASSESSMENT — PAIN DESCRIPTION - DESCRIPTORS
DESCRIPTORS: ACHING;DISCOMFORT;DULL
DESCRIPTORS: ACHING;DISCOMFORT;DULL

## 2023-04-22 ASSESSMENT — PAIN DESCRIPTION - ORIENTATION
ORIENTATION: LEFT
ORIENTATION: LEFT

## 2023-04-22 ASSESSMENT — PAIN DESCRIPTION - ONSET
ONSET: ON-GOING
ONSET: GRADUAL

## 2023-04-22 ASSESSMENT — PAIN DESCRIPTION - FREQUENCY
FREQUENCY: CONTINUOUS
FREQUENCY: INTERMITTENT

## 2023-04-23 VITALS
SYSTOLIC BLOOD PRESSURE: 120 MMHG | OXYGEN SATURATION: 99 % | WEIGHT: 265 LBS | HEART RATE: 62 BPM | RESPIRATION RATE: 17 BRPM | TEMPERATURE: 97.9 F | HEIGHT: 78 IN | BODY MASS INDEX: 30.66 KG/M2 | DIASTOLIC BLOOD PRESSURE: 85 MMHG

## 2023-04-23 LAB
BACTERIA FLD AEROBE CULT: NORMAL
GRAM STN SPEC: NORMAL

## 2023-04-23 PROCEDURE — 6370000000 HC RX 637 (ALT 250 FOR IP): Performed by: FAMILY MEDICINE

## 2023-04-23 PROCEDURE — 1200000000 HC SEMI PRIVATE

## 2023-04-23 PROCEDURE — 2580000003 HC RX 258: Performed by: INTERNAL MEDICINE

## 2023-04-23 PROCEDURE — 6360000002 HC RX W HCPCS: Performed by: INTERNAL MEDICINE

## 2023-04-23 PROCEDURE — 2580000003 HC RX 258: Performed by: FAMILY MEDICINE

## 2023-04-23 RX ADMIN — AMPICILLIN SODIUM AND SULBACTAM SODIUM 3000 MG: 2; 1 INJECTION, POWDER, FOR SOLUTION INTRAMUSCULAR; INTRAVENOUS at 20:23

## 2023-04-23 RX ADMIN — OXYCODONE HYDROCHLORIDE 10 MG: 10 TABLET ORAL at 19:14

## 2023-04-23 RX ADMIN — OXYCODONE HYDROCHLORIDE 10 MG: 10 TABLET ORAL at 05:53

## 2023-04-23 RX ADMIN — SODIUM CHLORIDE, PRESERVATIVE FREE 20 ML: 5 INJECTION INTRAVENOUS at 20:24

## 2023-04-23 RX ADMIN — OXYCODONE HYDROCHLORIDE 10 MG: 10 TABLET ORAL at 15:10

## 2023-04-23 RX ADMIN — AMPICILLIN SODIUM AND SULBACTAM SODIUM 3000 MG: 2; 1 INJECTION, POWDER, FOR SOLUTION INTRAMUSCULAR; INTRAVENOUS at 08:24

## 2023-04-23 RX ADMIN — SODIUM CHLORIDE, PRESERVATIVE FREE 10 ML: 5 INJECTION INTRAVENOUS at 08:25

## 2023-04-23 RX ADMIN — ASPIRIN 325 MG: 325 TABLET, COATED ORAL at 20:23

## 2023-04-23 RX ADMIN — ASPIRIN 325 MG: 325 TABLET, COATED ORAL at 08:41

## 2023-04-23 RX ADMIN — SODIUM CHLORIDE, PRESERVATIVE FREE 10 ML: 5 INJECTION INTRAVENOUS at 20:24

## 2023-04-23 RX ADMIN — HEPARIN 300 UNITS: 100 SYRINGE at 10:19

## 2023-04-23 RX ADMIN — LOSARTAN POTASSIUM 25 MG: 25 TABLET, FILM COATED ORAL at 08:41

## 2023-04-23 RX ADMIN — AMPICILLIN SODIUM AND SULBACTAM SODIUM 3000 MG: 2; 1 INJECTION, POWDER, FOR SOLUTION INTRAMUSCULAR; INTRAVENOUS at 14:51

## 2023-04-23 RX ADMIN — SODIUM CHLORIDE, PRESERVATIVE FREE 10 ML: 5 INJECTION INTRAVENOUS at 14:51

## 2023-04-23 RX ADMIN — HEPARIN 300 UNITS: 100 SYRINGE at 20:24

## 2023-04-23 RX ADMIN — SODIUM CHLORIDE, PRESERVATIVE FREE 10 ML: 5 INJECTION INTRAVENOUS at 10:20

## 2023-04-23 RX ADMIN — AMPICILLIN SODIUM AND SULBACTAM SODIUM 3000 MG: 2; 1 INJECTION, POWDER, FOR SOLUTION INTRAMUSCULAR; INTRAVENOUS at 01:43

## 2023-04-23 ASSESSMENT — PAIN DESCRIPTION - ORIENTATION
ORIENTATION: LEFT

## 2023-04-23 ASSESSMENT — PAIN DESCRIPTION - PAIN TYPE
TYPE: SURGICAL PAIN
TYPE: SURGICAL PAIN;ACUTE PAIN

## 2023-04-23 ASSESSMENT — PAIN SCALES - GENERAL
PAINLEVEL_OUTOF10: 7
PAINLEVEL_OUTOF10: 3
PAINLEVEL_OUTOF10: 7
PAINLEVEL_OUTOF10: 7
PAINLEVEL_OUTOF10: 3

## 2023-04-23 ASSESSMENT — PAIN DESCRIPTION - DESCRIPTORS
DESCRIPTORS: ACHING;DISCOMFORT;SORE
DESCRIPTORS: ACHING;DISCOMFORT;DULL

## 2023-04-23 ASSESSMENT — PAIN DESCRIPTION - LOCATION
LOCATION: KNEE
LOCATION: KNEE
LOCATION: LEG

## 2023-04-23 ASSESSMENT — PAIN DESCRIPTION - ONSET
ONSET: ON-GOING
ONSET: GRADUAL

## 2023-04-23 ASSESSMENT — PAIN DESCRIPTION - FREQUENCY
FREQUENCY: CONTINUOUS
FREQUENCY: INTERMITTENT

## 2023-04-24 VITALS
SYSTOLIC BLOOD PRESSURE: 138 MMHG | DIASTOLIC BLOOD PRESSURE: 79 MMHG | BODY MASS INDEX: 30.66 KG/M2 | RESPIRATION RATE: 20 BRPM | WEIGHT: 265 LBS | HEART RATE: 71 BPM | OXYGEN SATURATION: 99 % | HEIGHT: 78 IN | TEMPERATURE: 97.3 F

## 2023-04-24 LAB
BACTERIA BLD CULT ORG #2: NORMAL
BACTERIA BLD CULT: ABNORMAL
BACTERIA FLD AEROBE CULT: NORMAL
GRAM STN SPEC: NORMAL
ORGANISM: ABNORMAL

## 2023-04-24 PROCEDURE — 6360000002 HC RX W HCPCS: Performed by: INTERNAL MEDICINE

## 2023-04-24 PROCEDURE — 2580000003 HC RX 258: Performed by: FAMILY MEDICINE

## 2023-04-24 PROCEDURE — 2580000003 HC RX 258: Performed by: INTERNAL MEDICINE

## 2023-04-24 PROCEDURE — 6370000000 HC RX 637 (ALT 250 FOR IP): Performed by: FAMILY MEDICINE

## 2023-04-24 PROCEDURE — 97530 THERAPEUTIC ACTIVITIES: CPT

## 2023-04-24 RX ADMIN — SODIUM CHLORIDE, PRESERVATIVE FREE 10 ML: 5 INJECTION INTRAVENOUS at 09:42

## 2023-04-24 RX ADMIN — HEPARIN 300 UNITS: 100 SYRINGE at 09:42

## 2023-04-24 RX ADMIN — LOSARTAN POTASSIUM 25 MG: 25 TABLET, FILM COATED ORAL at 09:17

## 2023-04-24 RX ADMIN — AMPICILLIN SODIUM AND SULBACTAM SODIUM 3000 MG: 2; 1 INJECTION, POWDER, FOR SOLUTION INTRAMUSCULAR; INTRAVENOUS at 01:50

## 2023-04-24 RX ADMIN — AMPICILLIN SODIUM AND SULBACTAM SODIUM 3000 MG: 2; 1 INJECTION, POWDER, FOR SOLUTION INTRAMUSCULAR; INTRAVENOUS at 13:40

## 2023-04-24 RX ADMIN — HEPARIN 300 UNITS: 100 SYRINGE at 01:50

## 2023-04-24 RX ADMIN — AMPICILLIN SODIUM AND SULBACTAM SODIUM 3000 MG: 2; 1 INJECTION, POWDER, FOR SOLUTION INTRAMUSCULAR; INTRAVENOUS at 09:41

## 2023-04-24 RX ADMIN — ASPIRIN 325 MG: 325 TABLET, COATED ORAL at 09:17

## 2023-04-24 RX ADMIN — OXYCODONE HYDROCHLORIDE 10 MG: 10 TABLET ORAL at 02:19

## 2023-04-24 RX ADMIN — SODIUM CHLORIDE, PRESERVATIVE FREE 10 ML: 5 INJECTION INTRAVENOUS at 01:51

## 2023-04-24 ASSESSMENT — PAIN DESCRIPTION - ONSET: ONSET: GRADUAL

## 2023-04-24 ASSESSMENT — PAIN DESCRIPTION - FREQUENCY: FREQUENCY: INTERMITTENT

## 2023-04-24 ASSESSMENT — PAIN DESCRIPTION - PAIN TYPE: TYPE: SURGICAL PAIN

## 2023-04-24 ASSESSMENT — PAIN SCALES - GENERAL
PAINLEVEL_OUTOF10: 0
PAINLEVEL_OUTOF10: 6
PAINLEVEL_OUTOF10: 0

## 2023-04-24 ASSESSMENT — PAIN DESCRIPTION - LOCATION: LOCATION: KNEE

## 2023-04-24 ASSESSMENT — PAIN DESCRIPTION - ORIENTATION: ORIENTATION: LEFT

## 2023-04-24 ASSESSMENT — PAIN - FUNCTIONAL ASSESSMENT: PAIN_FUNCTIONAL_ASSESSMENT: PREVENTS OR INTERFERES SOME ACTIVE ACTIVITIES AND ADLS

## 2023-04-24 ASSESSMENT — PAIN DESCRIPTION - DESCRIPTORS: DESCRIPTORS: ACHING;DISCOMFORT;DULL

## 2023-04-24 NOTE — CARE COORDINATION
4/24/2023social work transition of care planning  Pt plan is home. [id number]pt can discharge after 2 pm dose of iv atb. [address] will see for evening dose of Iv atb. Pt updated,pt's family to transport at discharge.   Electronically signed by ESME Navarrete on 4/24/2023 at 9:40 AM

## 2023-04-24 NOTE — PROGRESS NOTES
Department of Internal Medicine  Infectious Diseases  Progress  Note      C/C:   Left knee septic arthritis       Denies fever or chills  Reports pain in the knee  Afebrile       Current Facility-Administered Medications   Medication Dose Route Frequency Provider Last Rate Last Admin    ceFAZolin (ANCEF) 2,000 mg in sterile water 20 mL IV syringe  2,000 mg IntraVENous See Admin Instructions Clover Alas, DO   2,000 mg at 04/19/23 2035    sodium chloride flush 0.9 % injection 5-40 mL  5-40 mL IntraVENous 2 times per day Redgie Shallow, DO   10 mL at 04/19/23 2237    sodium chloride flush 0.9 % injection 5-40 mL  5-40 mL IntraVENous PRN Redgie Shallow, DO        0.9 % sodium chloride infusion   IntraVENous PRN Redgie Shallow, DO        ondansetron (ZOFRAN-ODT) disintegrating tablet 4 mg  4 mg Oral Q8H PRN Redgie Shallow, DO        Or    ondansetron (ZOFRAN) injection 4 mg  4 mg IntraVENous Q6H PRN Redgie Shallow, DO        acetaminophen (TYLENOL) tablet 650 mg  650 mg Oral Q4H PRN Redgie Shallow, DO   650 mg at 04/19/23 1700    oxyCODONE (ROXICODONE) immediate release tablet 5 mg  5 mg Oral Q4H PRN Redgie Shallow, DO   5 mg at 04/20/23 0741    Or    oxyCODONE HCl (OXY-IR) immediate release tablet 10 mg  10 mg Oral Q4H PRN Redgie Shallow, DO   10 mg at 04/19/23 1510    aspirin EC tablet 325 mg  325 mg Oral BID Redgie Shallow, DO   325 mg at 04/20/23 0854    vancomycin (VANCOCIN) 1,250 mg in sodium chloride 0.9 % 250 mL IVPB  1,250 mg IntraVENous Q12H Srinivas Bains .7 mL/hr at 04/20/23 0900 1,250 mg at 04/20/23 0900    losartan (COZAAR) tablet 25 mg  25 mg Oral Daily Wyonia Labor, DO   25 mg at 04/20/23 0854           REVIEW OF SYSTEMS:    CONSTITUTIONAL:  Denies fever, chill or rigors. HEENT: denies blurring of vision or double vision, denies hearing problem  RESPIRATORY: denies cough, shortness of breath, sputum expectoration.   CARDIOVASCULAR:  Denies palpitation or chest pain   GASTROINTESTINAL:  Denies abdomen pain,
Department of Internal Medicine  Infectious Diseases  Progress  Note      C/C:   Left knee septic arthritis       Denies fever or chills  Reports pain in the knee- better   Afebrile       Current Facility-Administered Medications   Medication Dose Route Frequency Provider Last Rate Last Admin    ampicillin-sulbactam (UNASYN) 3,000 mg in sodium chloride 0.9 % 100 mL IVPB ([id number])  3,000 mg IntraVENous Q6H Srinivas P MD Nara   Stopped at 04/22/23 0916    lidocaine PF 1 % injection 5 mL  5 mL IntraDERmal Once Peng Lezama MD        sodium chloride flush 0.9 % injection 5-40 mL  5-40 mL IntraVENous 2 times per day Greg Lines MD Nara   10 mL at 04/22/23 0846    sodium chloride flush 0.9 % injection 5-40 mL  5-40 mL IntraVENous PRN Srinivas P MD Nara        0.9 % sodium chloride infusion   IntraVENous PRN Srinivas P MD Nara        heparin flush 100 UNIT/ML injection 300 Units  3 mL IntraVENous 2 times per day Peng Lezama MD   300 Units at 04/22/23 0846    heparin flush 100 UNIT/ML injection 300 Units  3 mL IntraCATHeter PRN Greg Bains MD   300 Units at 04/20/23 2245    sodium chloride flush 0.9 % injection 5-40 mL  5-40 mL IntraVENous 2 times per day Danielito Palomo DO   10 mL at 04/22/23 6640    sodium chloride flush 0.9 % injection 5-40 mL  5-40 mL IntraVENous PRN Danielito Palomo DO        0.9 % sodium chloride infusion   IntraVENous PRN Danielito Palomo, DO        ondansetron (ZOFRAN-ODT) disintegrating tablet 4 mg  4 mg Oral Q8H PRN Danielito Palomo,         Or    ondansetron (ZOFRAN) injection 4 mg  4 mg IntraVENous Q6H PRN Danielito Palomo, DO        acetaminophen (TYLENOL) tablet 650 mg  650 mg Oral Q4H PRN Danielito Palomo, DO   650 mg at 04/20/23 1608    oxyCODONE (ROXICODONE) immediate release tablet 5 mg  5 mg Oral Q4H PRN Danielito Palomo, DO   5 mg at 04/20/23 1340    Or    oxyCODONE HCl (OXY-IR) immediate release tablet 10 mg  10 mg Oral Q4H PRN Danielito Palomo DO   10 mg at 04/21/23 1841    aspirin EC tablet 325 mg  325 mg
Department of Internal Medicine  Infectious Diseases  Progress  Note      C/C:   Left knee septic arthritis       Denies fever or chills  Reports soreness and bleeding in the knee incision wound  Afebrile       Current Facility-Administered Medications   Medication Dose Route Frequency Provider Last Rate Last Admin    ampicillin-sulbactam (UNASYN) 3,000 mg in sodium chloride 0.9 % 100 mL IVPB ([id number])  3,000 mg IntraVENous Q6H Srinivas Bains  mL/hr at 04/24/23 1340 3,000 mg at 04/24/23 1340    lidocaine PF 1 % injection 5 mL  5 mL IntraDERmal Once Srinivas Bains MD        sodium chloride flush 0.9 % injection 5-40 mL  5-40 mL IntraVENous 2 times per day Estela Bains MD   10 mL at 04/24/23 0942    sodium chloride flush 0.9 % injection 5-40 mL  5-40 mL IntraVENous PRN Srinivas Bains MD   10 mL at 04/24/23 0151    0.9 % sodium chloride infusion   IntraVENous PRN Srinivas Bains MD        heparin flush 100 UNIT/ML injection 300 Units  3 mL IntraVENous 2 times per day Matt Olivo MD   300 Units at 04/24/23 0942    heparin flush 100 UNIT/ML injection 300 Units  3 mL IntraCATHeter PRN Estela Bains MD   300 Units at 04/24/23 0150    sodium chloride flush 0.9 % injection 5-40 mL  5-40 mL IntraVENous 2 times per day Kole Nieto, DO   10 mL at 04/24/23 0942    sodium chloride flush 0.9 % injection 5-40 mL  5-40 mL IntraVENous PRN Kole Nieto, DO        0.9 % sodium chloride infusion   IntraVENous PRN Cheryln Gerson, DO        ondansetron (ZOFRAN-ODT) disintegrating tablet 4 mg  4 mg Oral Q8H PRN Nonan Gerson, DO        Or    ondansetron (ZOFRAN) injection 4 mg  4 mg IntraVENous Q6H PRN Nonan Gerson, DO        acetaminophen (TYLENOL) tablet 650 mg  650 mg Oral Q4H PRN Nonan Gerson, DO   650 mg at 04/20/23 1608    oxyCODONE (ROXICODONE) immediate release tablet 5 mg  5 mg Oral Q4H PRN Nonan Gerson, DO   5 mg at 04/20/23 1340    Or    oxyCODONE HCl (OXY-IR) immediate release tablet 10 mg  10 mg Oral Q4H PRN Ruba Anthony
Department of Orthopedic Surgery  Resident Progress Note    Patient seen and examined. Doing well this morning, reports feeling much better since surgery, states he can bend his knee much easier and with less pain.     VITALS:  BP (!) 155/98   Pulse 81   Temp 97.6 °F (36.4 °C) (Temporal)   Resp 18   Ht 6' 7\" (2.007 m)   Wt 265 lb (120.2 kg)   SpO2 100%   BMI 29.85 kg/m²     General: alert and oriented to person, place and time, well-developed and well-nourished, in no acute distress    MUSCULOSKELETAL:   left lower extremity:  Dressing C/D/I  Compartments soft and compressible  +PF/DF/EHL  +2/4 DP & PT pulses, Brisk Cap refill, Toes warm and perfused  Distal sensation grossly intact to Peroneals, Sural, Saphenous, and tibial nrs    CBC:   Lab Results   Component Value Date/Time    WBC 9.3 04/19/2023 09:22 AM    HGB 12.6 04/19/2023 09:22 AM    HCT 39.3 04/19/2023 09:22 AM     04/19/2023 09:22 AM     PT/INR:    Lab Results   Component Value Date/Time    PROTIME 13.3 04/19/2023 09:22 AM    INR 1.2 04/19/2023 09:22 AM       Intraop Cultures: sent    ASSESSMENT  S/P irrigation debridement of left knee and tibial plateau, removal of infected hardware from left tibial plateau    PLAN      Continue physical therapy and protocol: Partial weightbearing, 50%-left LE  Antibiotic coverage per infectious disease, recommendations appreciated  Deep venous thrombosis prophylaxis -aspirin, early mobilization  PT/OT  Pain Control: IV and PO  Monitor cultures  Patient will likely be off work for at least a week, will place documentation in chart  D/C Plan: Pending cultures, final antibiotic recs    Electronically signed by Caryn Manley DO on 4/20/2023 at 6:03 AM
Department of Orthopedic Surgery  Resident Progress Note    Patient seen and examined. Pain controlled. No new complaints. Denies chest pain, shortness of breath, dizziness/lightheadedness. Pain is well controlled. Explained that there is positive cultures and will need antibiotics. VITALS:  BP (!) 126/92   Pulse 63   Temp 97.3 °F (36.3 °C) (Temporal)   Resp 18   Ht 6' 7\" (2.007 m)   Wt 265 lb (120.2 kg)   SpO2 98%   BMI 29.85 kg/m²     General: alert and oriented to person, place and time, well-developed and well-nourished, in no acute distress    MUSCULOSKELETAL:   left lower extremity:  Dressing C/D/I, changed today. There is one area of serous drainage about the proximal incision site. Compartments soft and compressible  +PF/DF/EHL, positive knee ROM without pain  +Brisk Cap refill, Toes warm and perfused  Distal sensation grossly intact to Peroneals, Sural, Saphenous, and tibial nrs    CBC:   Lab Results   Component Value Date/Time    WBC 9.3 04/19/2023 09:22 AM    HGB 12.6 04/19/2023 09:22 AM    HCT 39.3 04/19/2023 09:22 AM     04/19/2023 09:22 AM     PT/INR:    Lab Results   Component Value Date/Time    PROTIME 13.3 04/19/2023 09:22 AM    INR 1.2 04/19/2023 09:22 AM       Intraop Cultures: + Staph x 4 awaiting sensitivities. ASSESSMENT  S/P Left tibial plateau hardware removal, I and D of skin, subcu, bone, placement of antibiotic delivery device - 4/19/21    PLAN      Continue physical therapy and protocol: WBAT - LLE  Antibiotics per ID  Dressing changed. Deep venous thrombosis prophylaxis - , early mobilization  Doing well POD #2  PT/OT  Pain Control: IV and PO  Monitor H&H  D/C Plan:  Pending final antibiotic plan.
Department of Orthopedic Surgery  Resident Progress Note    Patient seen and examined. Pain controlled. No new complaints. Denies chest pain, shortness of breath, dizziness/lightheadedness. Pain is well controlled. VITALS:  /85   Pulse 62   Temp 97.9 °F (36.6 °C) (Temporal)   Resp 17   Ht 6' 7\" (2.007 m)   Wt 265 lb (120.2 kg)   SpO2 99%   BMI 29.85 kg/m²     General: alert and oriented to person, place and time, well-developed and well-nourished, in no acute distress    MUSCULOSKELETAL:   left lower extremity:  Aquacel Dressing saturated  Compartments soft and compressible  +PF/DF/EHL, positive knee ROM without pain  +Brisk Cap refill, Toes warm and perfused  Distal sensation grossly intact to Peroneals, Sural, Saphenous, and tibial nrs    CBC:   Lab Results   Component Value Date/Time    WBC 9.3 04/19/2023 09:22 AM    HGB 12.6 04/19/2023 09:22 AM    HCT 39.3 04/19/2023 09:22 AM     04/19/2023 09:22 AM     PT/INR:    Lab Results   Component Value Date/Time    PROTIME 13.3 04/19/2023 09:22 AM    INR 1.2 04/19/2023 09:22 AM       Intraop Cultures: + Staph x 4 awaiting sensitivities. ASSESSMENT  S/P Left tibial plateau hardware removal, I and D of skin, subcu, bone, placement of antibiotic delivery device - 4/19/21    PLAN      Continue physical therapy and protocol: WBAT - LLE  Antibiotics per ID  Patient doing well, would like to be discharged home, will follow-up in the office with orthopedics this week  Deep venous thrombosis prophylaxis - , early mobilization  PT/OT  Pain Control: IV and PO  Monitor H&H  D/C Plan:  Pending final antibiotic plan.   Okay to discharge from orthopedic standpoint
Department of Orthopedic Surgery  Resident Progress Note    Patient seen and examined. Pain very well controlled. Doing well. Did have breakthrough drainage and dressing was reinforced. Dressing taken down today for wound assessment. No new complaints. Denies chest pain, shortness of breath, dizziness/lightheadedness. VITALS:  /85   Pulse 62   Temp 97.9 °F (36.6 °C) (Temporal)   Resp 17   Ht 6' 7\" (2.007 m)   Wt 265 lb (120.2 kg)   SpO2 99%   BMI 29.85 kg/m²     General: alert and oriented to person, place and time, well-developed and well-nourished, in no acute distress    MUSCULOSKELETAL:   left lower extremity:  Dressing removed. Incision well approximated- no active serosanguinous drainage at this juncture. Compartments soft and compressible  +PF/DF/EHL  +2/4 DP & PT pulses, Brisk Cap refill, Toes warm and perfused  Distal sensation grossly intact to Peroneals, Sural, Saphenous, and tibial nrs    CBC:   Lab Results   Component Value Date/Time    WBC 9.3 04/19/2023 09:22 AM    HGB 12.6 04/19/2023 09:22 AM    HCT 39.3 04/19/2023 09:22 AM     04/19/2023 09:22 AM     PT/INR:    Lab Results   Component Value Date/Time    PROTIME 13.3 04/19/2023 09:22 AM    INR 1.2 04/19/2023 09:22 AM       Intraop Cultures: sent, MSSA    ASSESSMENT  S/P Left tibia HIEU, L knee I and D for infection - 4/19/23    PLAN      Continue physical therapy and protocol: WBAT - LLE  ABX per ID appreciate their recs. Can be discharged pending what time Roselyn Schuster can be at his house tomorrow. Deep venous thrombosis prophylaxis - , early mobilization  Dressing changed today. PT/OT  Pain Control: PO  Monitor Labs  D/C Plan: Will continue to monitor closely as outpatient. DC later today pending what time [facility] can be at his house tomorrow.
Discharge instructions reviewed and given to patient.
Dr Neto villatoro served Medical consult.
Dr. Reid Mark on unit and notified of pt BP. Per Dr. Reid Mark RN can continue to medicate for pain to decrease pain. If BP continues to be elevated post dilaudid then RN is to give 5 mg IV labetalol once.
ID consult called to office.
IV team aware of needed PICC line insertion.
Patient to PACU. Appropriate monitors placed on patient. Cart locked and in lowest position with side rails up.   Call light within reach
Physical Therapy Initial Assessment     Name: Antoni Andrews  : 1970  MRN: [id number]      Date of Service: 2023    Evaluating PT:  Josiane Proctor, PT, DPT [id number]    Room #:  1671/7199-I  Diagnosis:  Closed bicondylar fracture of left tibial plateau [O03.440H]  PMHx/PSHx:  History reviewed. No pertinent past medical history. Procedure/Surgery:   Left knee septic arthritis , hardware infection s/p I & D and removal of the hardware   Reason for admission: Closed bicondylar fracture of left tibial plateau [V34.651I]  Precautions:  PWB 50% LLE, fall risk   Equipment Needs:  owns FWW    SUBJECTIVE:  Pt lives with roommate in [facility] with first floor set up, 4 MONIKA and full flight of steps to basement. Pt ambulated with no AD PTA but owns FWW. OBJECTIVE:   Initial Evaluation  Date: 23 Treatment Short Term/ Long Term   Goals   AM-PAC 6 Clicks /     Was pt agreeable to Eval/treatment? Yes     Does pt have pain?  Min soreness in knee     Bed Mobility  Rolling: NT  Supine to sit: sup  Sit to supine: NT  Scooting: sup  Rolling: ind  Supine to sit: ind  Sit to supine: ind  Scooting: ind   Transfers Sit to stand: SBA  Stand to sit: SBA  Stand pivot: SBA FWW  Sit to stand: ind  Stand to sit: ind  Stand pivot: ind    Ambulation    2x150 feet with SBA  FWW  250 feet with FWW ind   Stair negotiation: ascended and descended  3 steps with RUBEN rail SBA  12 steps with one rail ind   ROM BUE:  Refer to OT eval   BLE:  WNL except L knee limited d/t sx      Strength BUE:  Refer to OT eval   BLE:  WNL except L knee     Balance Sitting EOB:  sup  Dynamic Standing:  SBA FWW  Sitting EOB:  ind  Dynamic Standing:  ind     Pt is A & O x 4  Sensation:  Pt denies numbness and tingling to extremities  Edema:  none noted     Vitals:  Blood Pressure at rest -- Blood Pressure post session --   Heart Rate at rest -- Heart Rate post session --   SPO2 at rest -- SPO2 post session -     Therapeutic Exercises:  none
Physical Therapy Treatment     Name: Parish Villalpando  : 1970  MRN: [id number]      Date of Service: 2023    Evaluating PT:  Yair Bergeron PT, DPT [id number]    Room #:  5548/5784-T  Diagnosis:  Closed bicondylar fracture of left tibial plateau [O19.808V]  PMHx/PSHx:  History reviewed. No pertinent past medical history. Procedure/Surgery:   Left knee septic arthritis , hardware infection s/p I & D and removal of the hardware   Reason for admission: Closed bicondylar fracture of left tibial plateau [F88.542P]  Precautions:  PWB 50% LLE, fall risk   Equipment Needs:  owns FWW    SUBJECTIVE:  Pt lives with roommate in [facility] with first floor set up, 4 MONIKA and full flight of steps to basement. Pt ambulated with no AD PTA but owns FWW. OBJECTIVE:   Initial Evaluation  Date: 23 Treatment  23 Short Term/ Long Term   Goals   AM-PAC 6 Clicks 74/37     Was pt agreeable to Eval/treatment? Yes yes    Does pt have pain?  Min soreness in knee No pain     Bed Mobility  Rolling: NT  Supine to sit: sup  Sit to supine: NT  Scooting: sup Rolling: NT  Supine to sit: NT  Sit to supine: ind  Scooting: ind Rolling: ind  Supine to sit: ind  Sit to supine: ind  Scooting: ind   Transfers Sit to stand: SBA  Stand to sit: SBA  Stand pivot: SBA FWW Sit to stand: ind  Stand to sit: ind  Stand pivot: ind FWW Sit to stand: ind  Stand to sit: ind  Stand pivot: ind    Ambulation    2x150 feet with SBA  FWW 350ft ind  feet with FWW ind   Stair negotiation: ascended and descended  3 steps with RUBEN rail SBA NT 12 steps with one rail ind   ROM BUE:  Refer to OT eval   BLE:  WNL except L knee limited d/t sx      Strength BUE:  Refer to OT eval   BLE:  WNL except L knee     Balance Sitting EOB:  sup  Dynamic Standing:  SBA FWW  Sitting EOB:  ind  Dynamic Standing:  ind     Pt is A & O x 4  Sensation:  Pt denies numbness and tingling to extremities  Edema:  none noted     Vitals:  Blood Pressure at rest --
Vascular Access Procedure Note    Procedure Date:   4/20/2023    Pre-procedure Verification/Time-Out:  The proposed risks versus benefits of this procedure were discussed in detail by the physician with patient. written consent was obtained from the patient. Relevant documentation was reviewed prior to procedure including signed consent form and medications. All necessary equipment for procedure is available at time of procedure yes. An audible time out was done at 1615PM by team members, correctly identifying patients name, medical record number, correct side, correct site, and correct procedure to be performed with registered nurse members of the procedure team all in agreement.         Indication for Procedure:   Reason for Insertion: intravenous antibiotics    ASA Assessment (Required for Moderate & Deep Sedation):      Procedure:   Reason for Consultation: power PICC line    Clinician Performing Procedure:   Bea Zarate RN    Assistant:  none    Sedation:   Analgesia Used: lidocaine 1%    Procedure Details/Findings:  Catheter Cypriot Size: 5.5  Lot Number: [id number]  Product #: 03/31/2024  Expiration Date: 03/31/2024  Maximum Barrier Precautions: cap, eye shield, full body drape, gloves, gown, handwashing, and mask  Skin Prep: chlorhexidine  Technique: modified seldinger and ultrasound guided with VPS  Attempts: 1  Exposed (cm): 4  Total (cm): 50  Placement Site: right basilic vein  Vessel Size: 0.55  Dressing: securement device, transparent dressing, and biopatch  Blood Return: Yes   Ultrasound Guidance: Yes   Arm Circumference Mid-Bicep (cm): 35  Chest X-Ray Ordered: VasoNova VPS  End Placement: caj    Complications:   none     Post-operative Condition:  stable  Patient Tolerated Procedure: well     Comments/Post-operative Education:   Post Procedure Interventions: post procedure education provided and patient verbalized understanding of education    Bianca Lane RN  04/20/23  4:54 PM
Blood Pressure post session --   Heart Rate at rest -- Heart Rate post session --   SPO2 at rest -- SPO2 post session -     Therapeutic Exercises:  none performed this visit    Patient education  Pt educated on PWB status on LLE     Patient response to education:   Pt verbalized understanding Pt demonstrated skill Pt requires further education in this area   x x      ASSESSMENT:    Conditions Requiring Skilled Therapeutic Intervention:    [x]Decreased strength     []Decreased ROM  [x]Decreased functional mobility  [x]Decreased balance   [x]Decreased endurance   []Decreased posture  []Decreased sensation  []Decreased coordination   []Decreased vision  []Decreased safety awareness   [x]Increased pain       Comments:  Pt in bed on arrival, agreeable to PT tx. Pt with no pain and notes improved activity tolerance. PT ambulated extensive distances today with walker without physical assist. Pt assisted back to room and independently transferred to supine at end of session. Pt encouraged to increase OOB activity and ambulate while in hospital to improve strength. Pt left with all needs met at end of session with call light in reach. Treatment:  Patient practiced and was instructed in the following treatment:    Bed mobility: performed with cues for safety awareness and proper hand placement to promote improved functional independence. Transfer Training: STS, SPT with FWW   Gait training: ambulation of increased distance in hallway with FWW and good maintenance of PWB      Pt's/ family goals   1. Return home safely. Prognosis is good for reaching above PT goals. Patient and or family understand(s) diagnosis, prognosis, and plan of care.   yes    PHYSICAL THERAPY PLAN OF CARE:    PT POC is established based on physician order and patient diagnosis     Referring provider/PT Order:    04/19/23 0745  PT eval and treat  Start:  04/19/23 0745,   End:  04/19/23 0745,   ONE TIME,   Standing Count:  1 Occurrences,   R
independence, safety, and fall prevention  Patient/Family education to increase follow through with safety techniques and functional independence  Recommendation of environmental modifications for increased safety with functional transfers/mobility and ADLs  Therapeutic exercise to improve motor endurance, ROM, and functional strength for ADLs/functional transfers  Therapeutic activities to facilitate/challenge dynamic balance, stand tolerance for increased safety and independence with ADLs  Therapeutic activities to facilitate gross/fine motor skills for increased independence with ADLs  Positioning to improve skin integrity, interaction with environment and functional independence     Recommended Adaptive Equipment:  TBD      Home Living: Pt lives with roommate in a 1 1/2 story with 4 steps to enter with L HR. B&B on main level. Bathroom setup: tub/shower, high toilet   Equipment owned: spc, ww, crutches (all tall), shower bench     Prior Level of Function: Ind. with ADLs , Ind. with IADLs; ambulated without A.D. Driving: active  Occupation: assembly line     Pain Level: Min  L LE discomfort  Cognition: A&O: 4/4; Follows multi- step directions              Memory:  G              Sequencing:  G              Problem solving:  G              Judgement/safety:  G                Functional Assessment:  AM-PAC Daily Activity Raw Score: 19/24    Initial Eval Status  Date: 4-20-23 Treatment Status  Date: 4/21/23 STGs = LTGs  Time frame: 10-14 days   Feeding Ind. Ind Mod I/ Ind   Grooming Set up  Sup  To wash hands standing at sink Modified [place]    UB Dressing Set up  per last tx Modified [place]    LB Dressing Min A for B socks  SUP  To don/doff socks and shoes seated EOB Modified [place]    Bathing SBA with sim.  task  per last tx Modified [place]    Toileting NT  sup- clothing management Modified [place]    Bed Mobility  Supine to sit: Sup  Sit to supine: NT  Mod I- supine<->sit Supine to
04/19/2023 09:22 AM    HGB 12.6 04/19/2023 09:22 AM    HCT 39.3 04/19/2023 09:22 AM     04/19/2023 09:22 AM    MCV 88.1 04/19/2023 09:22 AM    MCH 28.3 04/19/2023 09:22 AM    MCHC 32.1 04/19/2023 09:22 AM    RDW 12.5 04/19/2023 09:22 AM    LYMPHOPCT 17.4 04/18/2023 03:18 PM    MONOPCT 8.3 04/18/2023 03:18 PM    BASOPCT 1.1 04/18/2023 03:18 PM    MONOSABS 0.77 04/18/2023 03:18 PM    LYMPHSABS 1.61 04/18/2023 03:18 PM    EOSABS 0.55 04/18/2023 03:18 PM    BASOSABS 0.10 04/18/2023 03:18 PM       CMP     Lab Results   Component Value Date/Time     04/19/2023 09:22 AM    K 4.3 04/19/2023 09:22 AM    K 3.9 06/25/2021 05:38 AM     04/19/2023 09:22 AM    CO2 29 04/19/2023 09:22 AM    BUN 12 04/19/2023 09:22 AM    CREATININE 0.9 04/19/2023 09:22 AM    GFRAA >60 06/25/2021 05:38 AM    LABGLOM >60 04/19/2023 09:22 AM    GLUCOSE 109 04/19/2023 09:22 AM    PROT 8.1 04/19/2023 09:22 AM    LABALBU 3.5 04/19/2023 09:22 AM    CALCIUM 9.6 04/19/2023 09:22 AM    BILITOT 0.5 04/19/2023 09:22 AM    ALKPHOS 114 04/19/2023 09:22 AM    AST 10 04/19/2023 09:22 AM    ALT 11 04/19/2023 09:22 AM       MICROBIOLOGY:        Cell Count Fluid Type  Synovial    Color, Fluid  Yellow    Appearance, Fluid  Cloudy    Nucl Cell, Fluid /uL 138,000    RBC, Fluid /uL 15,000    Neutrophil Count, Fluid % 92    Comment: Neutrophils, Body Fluid = Polymorphonuclear Leukocytes   Monocyte Count, Fluid % 8    Comment: Monocyte Count, Fluid includes all Mononuclear cells. Blood culture -  neg to date         Culture, Wound [[id number]] (Abnormal)    Collected: 04/19/23 0520    Updated: 04/20/23 1020    Specimen Source: Bursa/Synovial Cyst     Gram Stain Result Gram stain performed from swab, interpret results with   caution. Swab specimens of sterile fluids are inferior to   aspirate specimens for organism recovery.    Polymorphonuclear leukocytes not seen   Rare Epithelial cells   Rare Gram negative rods     Organism Staphylococcus aureus
ondansetron **OR** ondansetron, acetaminophen, oxyCODONE **OR** oxyCODONE    Labs:     No results for input(s): WBC, HGB, HCT, PLT in the last 72 hours. No results for input(s): NA, K, CL, CO2, BUN, CREATININE, CALCIUM, PHOS in the last 72 hours. Invalid input(s): MAGNES    No results for input(s): PROT, ALB, ALKPHOS, ALT, AST, BILITOT, AMYLASE, LIPASE in the last 72 hours. No results for input(s): INR in the last 72 hours. No results for input(s): Rhae Duos in the last 72 hours. Chronic labs:    Lab Results   Component Value Date    INR 1.2 04/19/2023       Radiology: REVIEWED DAILY    +++++++++++++++++++++++++++++++++++++++++++++++++  Masood Veras MD  [facility] - [address] [place] [address]  +++++++++++++++++++++++++++++++++++++++++++++++++  NOTE: This report was transcribed using voice recognition software. Every effort was made to ensure accuracy; however, inadvertent computerized transcription errors may be present.
to prevent breakdown, decrease edema  Skilled monitoring of vitals: to include BP, spO2 & HR during session  Sitting/standing Balance/Tolerance- to increased balance & activity tolerance during ADLs as well as facilitate proper posture and/or positioning. Rehab Potential: Good for established goals     Patient / Family Goal: to return home       Patient and/or family were instructed on functional diagnosis, prognosis/goals and OT plan of care. Demonstrated G understanding. Eval Complexity: Low     Time In: 11:05  Time Out: 11:30  Total Treatment Time: 10    Min Units   OT Eval Low 95997  x     OT Eval Medium 79526       OT Eval High 35896       OT Re-Eval [id number]       Therapeutic Ex 03637       Therapeutic Activities 70510       ADL/Self Care 79427  10  1   Orthotic Management 04562       Manual 81634       Neuro Re-Ed 47599       Non-Billable Time          Evaluation Time additionally includes thorough review of current medical information, gathering information on past medical history/social history and prior level of function, interpretation of standardized testing/informal observation of tasks, assessment of data and development of plan of care and goals. Sharon Lopez, OTR/L   License #  [id number]
Unknown

## 2023-04-25 ENCOUNTER — TELEPHONE (OUTPATIENT)
Dept: ORTHOPEDIC SURGERY | Age: 53
End: 2023-04-25

## 2023-04-25 NOTE — TELEPHONE ENCOUNTER
Patient called in stating Dr. Helena Zimmerman told him he is to be seen by the end of this week 4/27-4/28 instead of waiting fo rthe 2 week post op appointment. According to AVS it states 2 weeks. Routed to Providers for consideration and guidance. Patient: Randy Cooney  YOB: 1970  MRN: [id number]  Date of Procedure: 4/19/2023  Procedure(s):  LEFT KNEE IRRIGATION AND DEBRIDEMENT, LEFT TIBIA HARDWARE REMOVAL, debridement of infection of the bone proximal tibia with saucerization and insertion of antibiotic delivery device.   Surgeon(s):  Kylie Bennett MD

## 2023-04-25 NOTE — TELEPHONE ENCOUNTER
Patient 7 days post op tomorrow, Michelle Toro to see Friday for wound check, will be too soon for suture removal   Electronically signed by Bud Mackey PA-C on 4/25/2023 at 4:00 PM

## 2023-04-26 NOTE — TELEPHONE ENCOUNTER
Call placed to patient, appointment made at this time. Future Appointments   Date Time Provider Kristie Rodriguez   4/28/2023  8:30 AM SCHEDULE, SE [facility]   5/5/2023 10:00 AM SCHEDULE, [facility]   5/15/2023  9:15 AM [facility]ooq - CNP [facility]     Directions to office provided and patient verbalized understanding and is agreeable with plan.

## 2023-04-28 ENCOUNTER — OFFICE VISIT (OUTPATIENT)
Dept: ORTHOPEDIC SURGERY | Age: 53
End: 2023-04-28
Payer: COMMERCIAL

## 2023-04-28 VITALS — BODY MASS INDEX: 30.66 KG/M2 | HEIGHT: 78 IN | WEIGHT: 265 LBS

## 2023-04-28 DIAGNOSIS — T81.40XA POSTOPERATIVE INFECTION, UNSPECIFIED TYPE, INITIAL ENCOUNTER: Primary | ICD-10-CM

## 2023-04-28 PROCEDURE — 99213 OFFICE O/P EST LOW 20 MIN: CPT | Performed by: ORTHOPAEDIC SURGERY

## 2023-04-28 PROCEDURE — 99024 POSTOP FOLLOW-UP VISIT: CPT | Performed by: ORTHOPAEDIC SURGERY

## 2023-05-01 LAB
FUNGUS SPEC CULT: NORMAL
LOEFFLER MB STN SPEC: NORMAL

## 2023-05-02 DIAGNOSIS — S82.142A TIBIAL PLATEAU FRACTURE, LEFT, CLOSED, INITIAL ENCOUNTER: Primary | ICD-10-CM

## 2023-05-03 ENCOUNTER — OFFICE VISIT (OUTPATIENT)
Dept: ORTHOPEDIC SURGERY | Age: 53
End: 2023-05-03
Payer: COMMERCIAL

## 2023-05-03 ENCOUNTER — HOSPITAL ENCOUNTER (OUTPATIENT)
Dept: GENERAL RADIOLOGY | Age: 53
Discharge: HOME | End: 2023-05-05
Payer: COMMERCIAL

## 2023-05-03 DIAGNOSIS — S82.142S CLOSED FRACTURE OF LEFT TIBIAL PLATEAU, SEQUELA: Primary | ICD-10-CM

## 2023-05-03 DIAGNOSIS — M00.062 STAPHYLOCOCCAL ARTHRITIS OF LEFT KNEE (HCC): ICD-10-CM

## 2023-05-03 DIAGNOSIS — T81.42XD INFECTION OF DEEP INCISIONAL SURGICAL SITE AFTER PROCEDURE, SUBSEQUENT ENCOUNTER: ICD-10-CM

## 2023-05-03 DIAGNOSIS — S82.142A TIBIAL PLATEAU FRACTURE, LEFT, CLOSED, INITIAL ENCOUNTER: ICD-10-CM

## 2023-05-03 PROCEDURE — 99213 OFFICE O/P EST LOW 20 MIN: CPT | Performed by: PHYSICIAN ASSISTANT

## 2023-05-03 PROCEDURE — 73560 X-RAY EXAM OF KNEE 1 OR 2: CPT

## 2023-05-03 RX ORDER — ARGININE/GLUTAMINE/CALCIUM BMB 7G-7G-1.5G
1 POWDER IN PACKET (EA) ORAL DAILY
Qty: 30 EACH | Refills: 3 | Status: SHIPPED | OUTPATIENT
Start: 2023-05-03

## 2023-05-03 NOTE — PROGRESS NOTES
Chief Complaint   Patient presents with    Follow Up After Procedure     Left knee I&D, removal of hardware on 23. Ambulating with front wheeled walker. Picc line IV. Changing dressing daily. OP:SURGEON: Dr. Kenya Hollis MD  DATE OF PROCEDURE: 23  PROCEDURE:LEFT KNEE IRRIGATION AND DEBRIDEMENT, LEFT TIBIA HARDWARE REMOVAL, debridement of infection of the bone proximal tibia with saucerization and insertion of antibiotic delivery device. POD: 2 weeks    Subjective:  Sukhdeep Arvizu is following up from the above surgery. {He/she (caps):19685} is {Weight Bearin} on {Anatomy; location extremity:39993} extremity ***. {He/she (caps):57262} ambulates {With/no:65304} assistive device, ***. Pain to extremity is {description:849889} and {IS/IS LTM:73036} taking prescribed pain medication, {postoppainmed:49367}. They {denies/complains:77495} {Numbness:82177} to the {Anatomy; location extremity:53248} extremity. Denies calf pain, chest pain, or shortness of breath. Patient {Continues/Finished:28451} DVT prophylaxis, {PostOAC:56845}. Patient {IS/IS DARÍO:92029} participating in therapy, {postop therapy\:56976}. ***     Review of Systems -  All pertinent positives/negative in HPI     Objective:    General: Alert and oriented X 3, normocephalic atraumatic, external ears and eye normal, sclera clear, no acute distress, respirations easy and unlabored with no audible wheezes, skin warm and dry, speech and dress appropriate for noted age, affect euthymic.     Extremity:  {RIGHT LEFT BILATERAL CAPS LCGOH:45031} Lower Extremity  Skin clean dry and intact, {WITH/WITHOUT:10520} signs of infection ***  Incision {Exam; incision:87278} ***  {MILD:97026} edema noted  Compartments supple throughout thigh and leg  Calf supple and {TENDER NOT UMUCFJ:82291}  {Desc; negative/positive:33199} Homans  Demonstrates active ***  ***  States sensation intact to touch in {LE Nerve:42601} nerve distributions to

## 2023-05-03 NOTE — PROGRESS NOTES
[employer] form received by patient during today's office visit. Per patient, he does not wish form to be faxed. Paperwork completed, scanned into media. Returned original copy to patient.

## 2023-05-03 NOTE — PATIENT INSTRUCTIONS
Every other suture from the top half of left incision removed today  Keep incision as dry as possible, if more frequent dressing changes needed then please do so to allow to keep the incision dry  Discontinue use of Xeroform    Continue partial weightbearing at this time  Range of motion as you tolerate    Antibiotics per Infectious Disease Recommendations  Continue monitoring ESR/CRP    Recommend continuing Vitamin C and Zinc  Add Jj +/- Protein shake for wound healing    Recommend abstaining from all nicotine products if able    Continue to monitor for worsening signs of infection including   Redness  Warmth  Increased swelling  Increased drainage  Increasing Pain  Fevers/Chills    Plan for repeat wound check in 1 week, please call sooner with questions or concerns

## 2023-05-08 ENCOUNTER — OFFICE VISIT (OUTPATIENT)
Dept: ORTHOPEDIC SURGERY | Age: 53
End: 2023-05-08
Payer: COMMERCIAL

## 2023-05-08 DIAGNOSIS — T81.42XD INFECTION OF DEEP INCISIONAL SURGICAL SITE AFTER PROCEDURE, SUBSEQUENT ENCOUNTER: Primary | ICD-10-CM

## 2023-05-08 LAB
FUNGUS SPEC CULT: NORMAL
LOEFFLER MB STN SPEC: NORMAL

## 2023-05-08 PROCEDURE — 99024 POSTOP FOLLOW-UP VISIT: CPT | Performed by: PHYSICIAN ASSISTANT

## 2023-05-08 PROCEDURE — 99213 OFFICE O/P EST LOW 20 MIN: CPT

## 2023-05-08 NOTE — PROGRESS NOTES
sensation intact to touch in sural/deep peroneal/superficial peroneal/saphenous/posterior tibial nerve distributions to foot/ankle. Palpable dorsalis pedis and posterior tibialis pulses, cap refill brisk in toes, foot warm/perfused. Assessment:   Diagnosis Orders   1. Infection of deep incisional surgical site after procedure, subsequent encounter            Plan:  Removed proximal and distal sutures. Steri strips applied. Sutures at mid incision left intact to be removed at next OV in 1-2 wks. Continue dry dressings changed at least 1x per day or more often if saturated. Frequent ice, elevation, compression   Continue abx per ID recommendations   Continue ASA for DVT prophylaxis    Follow up in 7-10 days for wound check, no XR     Electronically signed by Jeovany Rush PA-C on 5/8/2023 at 10:52 AM  Note: This report was completed using [employer] voiced recognition software. Every effort has been made to ensure accuracy; however, inadvertent computerized transcription errors may be present.

## 2023-05-15 LAB
FUNGUS SPEC CULT: NORMAL
LOEFFLER MB STN SPEC: NORMAL

## 2023-05-16 ENCOUNTER — OFFICE VISIT (OUTPATIENT)
Dept: ORTHOPEDIC SURGERY | Age: 53
End: 2023-05-16
Payer: COMMERCIAL

## 2023-05-16 ENCOUNTER — HOSPITAL ENCOUNTER (OUTPATIENT)
Dept: GENERAL RADIOLOGY | Age: 53
Discharge: HOME | End: 2023-05-18
Payer: COMMERCIAL

## 2023-05-16 ENCOUNTER — HOSPITAL ENCOUNTER (OUTPATIENT)
Dept: INFUSION THERAPY | Age: 53
Setting detail: INFUSION SERIES
Discharge: HOME | End: 2023-05-16
Payer: COMMERCIAL

## 2023-05-16 DIAGNOSIS — Z98.890 S/P HARDWARE REMOVAL: ICD-10-CM

## 2023-05-16 DIAGNOSIS — T81.42XD INFECTION OF DEEP INCISIONAL SURGICAL SITE AFTER PROCEDURE, SUBSEQUENT ENCOUNTER: Primary | ICD-10-CM

## 2023-05-16 DIAGNOSIS — M00.062 STAPHYLOCOCCAL ARTHRITIS OF LEFT KNEE (HCC): ICD-10-CM

## 2023-05-16 PROBLEM — T81.42XA INFECTION OF DEEP INCISIONAL SURGICAL SITE AFTER PROCEDURE: Status: ACTIVE | Noted: 2023-05-16

## 2023-05-16 PROCEDURE — 99213 OFFICE O/P EST LOW 20 MIN: CPT | Performed by: PHYSICIAN ASSISTANT

## 2023-05-16 PROCEDURE — 99024 POSTOP FOLLOW-UP VISIT: CPT | Performed by: PHYSICIAN ASSISTANT

## 2023-05-16 PROCEDURE — C1751 CATH, INF, PER/CENT/MIDLINE: HCPCS

## 2023-05-16 PROCEDURE — 36569 INSJ PICC 5 YR+ W/O IMAGING: CPT

## 2023-05-16 PROCEDURE — 76937 US GUIDE VASCULAR ACCESS: CPT

## 2023-05-16 PROCEDURE — 71045 X-RAY EXAM CHEST 1 VIEW: CPT

## 2023-05-16 NOTE — PROGRESS NOTES
Patient here for picc line insertion. IV team notified. Patient changed into gown. Permit at the bedside.

## 2023-05-22 LAB
FUNGUS SPEC CULT: NORMAL
LOEFFLER MB STN SPEC: NORMAL

## 2023-05-31 ENCOUNTER — HOSPITAL ENCOUNTER (OUTPATIENT)
Dept: GENERAL RADIOLOGY | Age: 53
Discharge: HOME | End: 2023-06-02
Payer: COMMERCIAL

## 2023-05-31 ENCOUNTER — OFFICE VISIT (OUTPATIENT)
Dept: ORTHOPEDIC SURGERY | Age: 53
End: 2023-05-31
Payer: COMMERCIAL

## 2023-05-31 VITALS — WEIGHT: 265 LBS | HEIGHT: 78 IN | BODY MASS INDEX: 30.66 KG/M2

## 2023-05-31 DIAGNOSIS — T81.42XD INFECTION OF DEEP INCISIONAL SURGICAL SITE AFTER PROCEDURE, SUBSEQUENT ENCOUNTER: ICD-10-CM

## 2023-05-31 DIAGNOSIS — M00.062 STAPHYLOCOCCAL ARTHRITIS OF LEFT KNEE (HCC): Primary | ICD-10-CM

## 2023-05-31 DIAGNOSIS — Z98.890 S/P HARDWARE REMOVAL: ICD-10-CM

## 2023-05-31 DIAGNOSIS — M00.062 STAPHYLOCOCCAL ARTHRITIS OF LEFT KNEE (HCC): ICD-10-CM

## 2023-05-31 PROCEDURE — 73560 X-RAY EXAM OF KNEE 1 OR 2: CPT

## 2023-05-31 PROCEDURE — 99212 OFFICE O/P EST SF 10 MIN: CPT

## 2023-05-31 PROCEDURE — 99024 POSTOP FOLLOW-UP VISIT: CPT | Performed by: ORTHOPAEDIC SURGERY

## 2023-05-31 NOTE — PATIENT INSTRUCTIONS
Continue weightbearing as tolerated    Inquire on whether you should be on oral antibiotics and remove your PICC line Monday    Follow-up here in 3 to 4 weeks call sooner with any increased drainage questions or concerns

## 2023-05-31 NOTE — PROGRESS NOTES
Chief Complaint   Patient presents with    Follow-up     Patient presents from home for follow up L knee I&D w/[facility]. Patient reports that there has been no new drainage in over a week. Denies pain at this time. SUBJECTIVE: Patient is  approximately 6 weeks out from irrigation debridement of left proximal tibia with removal of hardware and I&D of bone. He is getting his PICC line out Monday. His infectious labs have trended down very nicely. He is feeling well and walking without assistance. He has 1 small 3 to 4 mm area in the middle of the incision that is still scabbed over but has no drainage. He is feeling well no fevers or chills. States he feels much better than before surgery. Denies any falls or traumas. Past Medical History:   Diagnosis Date    Staphylococcal arthritis of left knee ([address] 5/16/2023       Past Surgical History:   Procedure Laterality Date    FRACTURE SURGERY      Left tibal    HERNIA REPAIR      KNEE SURGERY Left 4/19/2023    LEFT KNEE IRRIGATION AND DEBRIDEMENT, LEFT TIBIA HARDWARE REMOVAL performed by Bud Meredith MD at [address] [place] [address] Left 6/6/2021    LEG EXTERNAL FIXATOR APPLICATION, LEFT performed by Jaylon Serrano MD at [address] [place] [address] Left 6/24/2021    REMOVAL OF LEFT LOWER EXTREMITY EXTERNAL FIXATOR,  OPEN REDUCTION INTERNAL FIXATION LEFT TIBIAL PLATEAU-  SYNTHES / GEORGE performed by Bud Meredith MD at [place] [address]       No family history on file. Social History     Tobacco Use    Smoking status: Never     Passive exposure: Past    Smokeless tobacco: Current     Types: Chew   Vaping Use    Vaping Use: Never used   Substance Use Topics    Alcohol use:  Yes     Alcohol/week: 12.0 standard drinks     Types: 12 Cans of beer per week     Comment: 12/week    Drug use: Never       No Known Allergies      Review of Systems -   General ROS: negative for - chills, fatigue, fever or night sweats  Respiratory ROS: no cough,

## 2023-08-23 NOTE — PROGRESS NOTES
Chg double lumen picc Placement 5/16/2023    Product number: [id number]   Lot Number: [id number]      Ultrasound: yes   Left Brachial vein:                Upper Arm Circumference: (CM) 37cm    Size:(FR)/GUAGE 5.5fr/51cm    Exposed Length: (CM) 5cm    Internal Length: (CM) 46cm   Cut: (CM) 4cm   Vein Measurement: 0.60cm    Suresh Curran RN  5/16/2023  12:21 PM    Joy Holman given picc discharge instruction. Verbalized understanding. OCCUPATIONAL THERAPY TREATMENT NOTE - INPATIENT        Room Number: 331/331-A           Presenting Problem: DVT in calf    Problem List  Principal Problem:    Pleural effusion  Active Problems:    Major depressive disorder, single episode, moderate (HCC) [FreeTextEntry1] : Mild anaphylaxis to peanut: Continue avoidance.  Carry EpiPen and or Auvi-Q.  Technique for both reviewed.  Food allergy plan given and reviewed.  School form completed. Allergic rhinitis (dust mites, pollen): Flonase or Nasacort as needed. Follow-up yearly.  Retest with blood work in 2 years. education    SUBJECTIVE  \" I am so happy to work with OT/PT\"    OBJECTIVE  Precautions: None    WEIGHT BEARING RESTRICTION  Weight Bearing Restriction: None                PAIN ASSESSMENT  Ratin  Location: back  Management Techniques: Repositioning;R functional transfer with mod I  Comment: sba    Patient will complete toileting with mod I  Comment: sba    Patient will tolerate standing for 7 minutes in prep for adls with mod I   Comment: sba/sup 5 min     Patient will complete item retrieval with mod

## 2024-06-16 ENCOUNTER — HOSPITAL ENCOUNTER (EMERGENCY)
Age: 54
Discharge: LEFT BEFORE DISCHARGE | End: 2024-06-16
Attending: EMERGENCY MEDICINE

## 2024-06-16 VITALS
RESPIRATION RATE: 18 BRPM | WEIGHT: 270 LBS | TEMPERATURE: 97.1 F | HEART RATE: 80 BPM | HEIGHT: 72 IN | OXYGEN SATURATION: 97 % | DIASTOLIC BLOOD PRESSURE: 74 MMHG | BODY MASS INDEX: 36.57 KG/M2 | SYSTOLIC BLOOD PRESSURE: 133 MMHG
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
## 2024-06-16 LAB
ALBUMIN SERPL-MCNC: 4.3 G/DL (ref 3.5–5.2)
ALP SERPL-CCNC: 73 U/L (ref 40–129)
ALT SERPL-CCNC: 24 U/L (ref 0–40)
ANION GAP SERPL CALCULATED.3IONS-SCNC: 18 MMOL/L (ref 7–16)
APAP SERPL-MCNC: <5 UG/ML (ref 10–30)
AST SERPL-CCNC: 19 U/L (ref 0–39)
BASOPHILS # BLD: 0.05 K/UL (ref 0–0.2)
BASOPHILS NFR BLD: 1 % (ref 0–2)
BILIRUB SERPL-MCNC: 0.4 MG/DL (ref 0–1.2)
BUN SERPL-MCNC: 10 MG/DL (ref 6–20)
CALCIUM SERPL-MCNC: 8.3 MG/DL (ref 8.6–10.2)
CHLORIDE SERPL-SCNC: 98 MMOL/L (ref 98–107)
CO2 SERPL-SCNC: 17 MMOL/L (ref 22–29)
CREAT SERPL-MCNC: 0.9 MG/DL (ref 0.7–1.2)
EOSINOPHIL # BLD: 0.03 K/UL (ref 0.05–0.5)
EOSINOPHILS RELATIVE PERCENT: 0 % (ref 0–6)
ERYTHROCYTE [DISTWIDTH] IN BLOOD BY AUTOMATED COUNT: 11.8 % (ref 11.5–15)
ETHANOLAMINE SERPL-MCNC: 194 MG/DL (ref 0–0.08)
GFR, ESTIMATED: >90 ML/MIN/1.73M2
GLUCOSE SERPL-MCNC: 122 MG/DL (ref 74–99)
HCT VFR BLD AUTO: 43 % (ref 37–54)
HGB BLD-MCNC: 14.8 G/DL (ref 12.5–16.5)
IMM GRANULOCYTES # BLD AUTO: 0.05 K/UL (ref 0–0.58)
IMM GRANULOCYTES NFR BLD: 1 % (ref 0–5)
LYMPHOCYTES NFR BLD: 1.14 K/UL (ref 1.5–4)
LYMPHOCYTES RELATIVE PERCENT: 10 % (ref 20–42)
MCH RBC QN AUTO: 30.7 PG (ref 26–35)
MCHC RBC AUTO-ENTMCNC: 34.4 G/DL (ref 32–34.5)
MCV RBC AUTO: 89.2 FL (ref 80–99.9)
MONOCYTES NFR BLD: 0.43 K/UL (ref 0.1–0.95)
MONOCYTES NFR BLD: 4 % (ref 2–12)
NEUTROPHILS NFR BLD: 84 % (ref 43–80)
NEUTS SEG NFR BLD: 9.22 K/UL (ref 1.8–7.3)
PLATELET # BLD AUTO: 262 K/UL (ref 130–450)
PMV BLD AUTO: 9.5 FL (ref 7–12)
POTASSIUM SERPL-SCNC: 3.9 MMOL/L (ref 3.5–5)
PROT SERPL-MCNC: 7.2 G/DL (ref 6.4–8.3)
RBC # BLD AUTO: 4.82 M/UL (ref 3.8–5.8)
SALICYLATES SERPL-MCNC: <0.3 MG/DL (ref 0–30)
SODIUM SERPL-SCNC: 133 MMOL/L (ref 132–146)
TOXIC TRICYCLIC SC,BLOOD: NEGATIVE
WBC OTHER # BLD: 10.9 K/UL (ref 4.5–11.5)

## 2024-06-16 PROCEDURE — 80053 COMPREHEN METABOLIC PANEL: CPT

## 2024-06-16 PROCEDURE — 85025 COMPLETE CBC W/AUTO DIFF WBC: CPT

## 2024-06-16 PROCEDURE — G0480 DRUG TEST DEF 1-7 CLASSES: HCPCS

## 2024-06-16 PROCEDURE — 80179 DRUG ASSAY SALICYLATE: CPT

## 2024-06-16 PROCEDURE — 99284 EMERGENCY DEPT VISIT MOD MDM: CPT

## 2024-06-16 PROCEDURE — 80307 DRUG TEST PRSMV CHEM ANLYZR: CPT

## 2024-06-16 PROCEDURE — 93005 ELECTROCARDIOGRAM TRACING: CPT | Performed by: EMERGENCY MEDICINE

## 2024-06-16 PROCEDURE — 80143 DRUG ASSAY ACETAMINOPHEN: CPT

## 2024-06-16 ASSESSMENT — PAIN - FUNCTIONAL ASSESSMENT
PAIN_FUNCTIONAL_ASSESSMENT: NONE - DENIES PAIN
PAIN_FUNCTIONAL_ASSESSMENT: NONE - DENIES PAIN

## 2024-06-16 NOTE — ED NOTES
At this time this nurse went to check on patient and is no longer in the bathroom and not longer in the department. This nurse did not see the patient in the lobby or the parking lot

## 2024-06-17 LAB
EKG ATRIAL RATE: 78 BPM
EKG P AXIS: 55 DEGREES
EKG P-R INTERVAL: 214 MS
EKG Q-T INTERVAL: 444 MS
EKG QRS DURATION: 104 MS
EKG QTC CALCULATION (BAZETT): 506 MS
EKG R AXIS: -30 DEGREES
EKG T AXIS: 32 DEGREES
EKG VENTRICULAR RATE: 78 BPM

## 2024-06-17 PROCEDURE — 93010 ELECTROCARDIOGRAM REPORT: CPT | Performed by: INTERNAL MEDICINE

## 2025-01-15 NOTE — DISCHARGE INSTRUCTIONS
Occupational Therapy Visit    Visit Type: Daily Treatment Note  Visit: 78  Referring Provider: Obey Tiwari MD  Medical Diagnosis (from order): I89.0 - Lymphedema     Diagnosis Precautions: BLE lymphedema prec's   Patient alert and oriented X3.    SUBJECTIVE                                                                                                               Pt presents wearing his Mobiderm garment on LLE and compression shorts.   Pt continues to use pump daily, most days, twice, and wears Farrow Wraps, Solidea garments and groin compression, everyday.     Pt brings in his new Jobst Farrow Classic wraps 30-40 mmHg XL, Tall to show OT.     Pain / Symptoms  - Pain rating (out of 10): Current: 0      OBJECTIVE                                                                                                                                    Treatment     Manual Therapy   Lymphatic Drainage:     Position: semi-reclined.    - Body region:        deep breathing and supraclavicular fossa and shoulders        Left: axilla, axillo-inguinal anastomosis, popliteal and lower leg        Right: groin and thigh (lumbar ln's)    -     Compression Bandage:    - Body region:         LLE - and lower leg    - Layer 1: stockinette (4\")    - Layer 2: cotton padding    - Layer 3: open cell foam    - Layer 4: short stretch bandaging (2-10 cm and 2-12 cm)    - Large piece of white channeled komprex foam placed on ant aspect of L lower leg. Leg bandaged from just above ankle to knee so pt can fit into his work boot.   Last bandage started from just below knee and finished just above ankle.     Pt donned all of his other compression garments for remainder of legs and R groin, at end of session. Pt will cont to wear his compression shorts for his groin swelling.         Therapeutic Activity  Discussed current situation. OT assessed skin and skin mobility is noted to be improved.     Patient Education/Instruction  - Compression  bandage:    - Wear schedule: wear instructed time as tolerated, remove if significant discomfort occurs    - Indications for removal  Discussed current situation.     Skilled input: verbal instruction/cues    Writer verbally educated and received verbal consent for hand placement, positioning of patient, and techniques to be performed today from patient for clothing adjustments for techniques, therapist position for techniques and hand placement and palpation for techniques as described above and how they are pertinent to the patient's plan of care.  Home Exercise Program  Cont leg and breathing ex's, use of pump, good skin care and use compression 24/7, all daily.        ASSESSMENT                                                                                                            OT noted that LLE is soft again, at start of session. Tissue softened and a moderate amount of skin wrinkling/mobility was noted during MLD, and coslow roller and light massager use.  Pt tolerated therapy well. Pt will begin using his new compression garments later this week.     Pt requires medically necessary skilled therapy interventions to be continued to allow pt to continue maximizing long term care of his Lymphedema to ease functional abilities and work tasks as a full time .      Pain/symptoms after session (out of 10): 0  Education:   - Results of above outlined education: Verbalizes understanding    PLAN                                                                                                                           Suggestions for next session as indicated: Progress per plan of care-Cont CDT to cont reduction of LLE and R groin.       Therapy procedure time and total treatment time can be found documented on the Time Entry flowsheet   office or physician immediately. For all above reactions: administer Solu-Cortef 100mg slow IV push x 1. VASCULAR ACCESS DRESSING CHANGE PROTOCOL  Cleanse insertion site with ChlorPrep® or equivalent three times. Secure catheter with Steri-Strips®, Bone®, or equivalent securing device. Apply Opsite® 3000 or equivalent transparent dressing. Change dressing twice weekly, maintaining sterile technique. If there is a BioPatch®, SilverSite® or equivalent, change once weekly only or as needed. FOLLOW-UP VISITS  Nursing staff should call the office during business hours to schedule a follow-up appointment once the patient is admitted to the service or facility. Every effort should be made to have patient follow-up within 2 weeks of discharge. Exception is made for ventilator-dependent patients. Continue IV antibiotic therapy until patient is seen in the office or unless specific stop date is noted on the original order or unless otherwise ordered by physician. Call office to ensure stop date is correct before stopping antibiotics. MD Elisa Huntley.  MD Tracey Pagan MD Godwin Fries, MD  [place]MD Andreia San MD Willa Madeira, MD

## (undated) DEVICE — TUBING SUCT 12FR MAL ALUM SHFT FN CAP VENT UNIV CONN W/ OBT

## (undated) DEVICE — PADDING,UNDERCAST,COTTON, 4"X4YD STERILE: Brand: MEDLINE

## (undated) DEVICE — PATIENT RETURN ELECTRODE, SINGLE-USE, CONTACT QUALITY MONITORING, ADULT, WITH 9FT CORD, FOR PATIENTS WEIGING OVER 33LBS. (15KG): Brand: MEGADYNE

## (undated) DEVICE — PAD,ABDOMINAL,5"X9",ST,LF,25/BX: Brand: MEDLINE INDUSTRIES, INC.

## (undated) DEVICE — GLOVE ORANGE PI 8   MSG9080

## (undated) DEVICE — 3M™ STERI-DRAPE™ U-DRAPE 1015: Brand: STERI-DRAPE™

## (undated) DEVICE — CLAMP EXT FIX L TI ALLY COMB CLP ON SELF HLD

## (undated) DEVICE — PADDING CAST W6INXL4YD COT LO LINTING WYTEX

## (undated) DEVICE — DRILL SYSTEM 7

## (undated) DEVICE — BIT DRL L180MM DIA4.3MM QUIK CPL W/O STP REUSE

## (undated) DEVICE — DRIP REDUCTION MANIFOLD

## (undated) DEVICE — INTENDED FOR TISSUE SEPARATION, AND OTHER PROCEDURES THAT REQUIRE A SHARP SURGICAL BLADE TO PUNCTURE OR CUT.: Brand: BARD-PARKER ® STAINLESS STEEL BLADES

## (undated) DEVICE — TOTAL KNEE PK

## (undated) DEVICE — 1000 S-DRAPE TOWEL DRAPE 10/BX: Brand: STERI-DRAPE™

## (undated) DEVICE — BNDG,ELSTC,MATRIX,STRL,6"X5YD,LF,HOOK&LP: Brand: MEDLINE

## (undated) DEVICE — 3M(TM) MEDIPORE(TM) +PAD SOFT CLOTH ADHESIVE WOUND DRESSING 3566: Brand: 3M™ MEDIPORE™

## (undated) DEVICE — ZIMMER® STERILE DISPOSABLE TOURNIQUET CUFF WITH PROTECTIVE SLEEVE AND PLC, DUAL PORT, SINGLE BLADDER, 34 IN. (86 CM)

## (undated) DEVICE — TOTAL TRAY, DB, 100% SILI FOLEY, 16FR 10: Brand: MEDLINE

## (undated) DEVICE — GOWN,BREATHABLE SLV,AURORA,XLG,STRL: Brand: MEDLINE

## (undated) DEVICE — GAUZE,SPONGE,AVANT,4"X4",4PLY,STRL,10/TR: Brand: MEDLINE

## (undated) DEVICE — BIT DRL L145MM DIA4.5MM ST S STL QUIK CPL NONRADIOPAQUE W/O

## (undated) DEVICE — SET ORTHO STD STORTSTD1

## (undated) DEVICE — CLOTH SURG PREP PREOPERATIVE CHLORHEXIDINE GLUC 2% READYPREP

## (undated) DEVICE — GOWN,AURORA,BRTHSLV,2XL,18/CS: Brand: MEDLINE

## (undated) DEVICE — POST EXT FIX DIA11MM 90DEG OUTRIG MAG RESONANCE CONDITIONAL

## (undated) DEVICE — BLADE CLIPPER GEN PURP NS

## (undated) DEVICE — STICK SPONGE PRESAT PVP PAINT STERILE

## (undated) DEVICE — DRESSING PETRO W3XL8IN OIL EMUL N ADH GZ KNIT IMPREG CELOS

## (undated) DEVICE — Device

## (undated) DEVICE — SCREW EXT FIX L175MM DIA5MM THRD L60MM S STL SELF DRL SCHNZ

## (undated) DEVICE — GLOVE ORTHO 8   MSG9480

## (undated) DEVICE — SURGICAL PROCEDURE PACK BASIC

## (undated) DEVICE — ANTISEPTIC 16OZ H PEROX 1ST AID ORAL DEBRIDING AGNT

## (undated) DEVICE — Z DISCONTINUED PER MEDLINE USE 2741944 DRESSING AQUACEL 12 IN SURG W9XL30CM SIL CVR WTRPRF VIR BACT BARR ANTIMIC

## (undated) DEVICE — DRAPE EQUIP CARM 72X42 IN RUBBER BND CLP

## (undated) DEVICE — GOWN,BREATHABLE,IMP SLV 3XL AURORA: Brand: MEDLINE

## (undated) DEVICE — DRESSING,GAUZE,XEROFORM,CURAD,5"X9",ST: Brand: CURAD

## (undated) DEVICE — APPLICATOR MEDICATED 26 CC SOLUTION HI LT ORNG CHLORAPREP

## (undated) DEVICE — SCREW FIX L200MM DIA5MM THRD L80MM S STL SELF DRL SCHNZ

## (undated) DEVICE — SCREW BNE L38MM DIA4.5MM PROX CORT TIB S STL ST LOK FULL
Type: IMPLANTABLE DEVICE | Status: NON-FUNCTIONAL
Removed: 2021-06-24

## (undated) DEVICE — READY WET SKIN SCRUB TRAY-LF: Brand: MEDLINE INDUSTRIES, INC.

## (undated) DEVICE — 3M™ IOBAN™ 2 ANTIMICROBIAL INCISE DRAPE 6650EZ: Brand: IOBAN™ 2

## (undated) DEVICE — SET ORTHO STD STORTSTD2

## (undated) DEVICE — BIT DRL L145MM DIA3.2MM ST QUIK CPL W/O STP REUSE

## (undated) DEVICE — CLAMP EXT FIX L PIN 6 POS MAG RESONANCE CONDITIONAL

## (undated) DEVICE — TOWEL,OR,DSP,ST,BLUE,DLX,10/PK,8PK/CS: Brand: MEDLINE

## (undated) DEVICE — POST EXT FIX DIA11MM STR OUTRIG MAG RESONANCE CONDITIONAL

## (undated) DEVICE — BANDAGE COMPR W4INXL10YD WHITE/BEIGE E MTRX HK LOOP CLSR

## (undated) DEVICE — PACK,UNIVERSAL,NO GOWNS: Brand: MEDLINE

## (undated) DEVICE — LOWER EXT KNEE DRAPE: Brand: MEDLINE INDUSTRIES, INC.

## (undated) DEVICE — SOLUTION IV IRRIG WATER 1000ML POUR BRL 2F7114

## (undated) DEVICE — BANDAGE,GAUZE,4.5"X4.1YD,STERILE,LF: Brand: MEDLINE

## (undated) DEVICE — BIT DRL L200MM DIA4.3MM CANN QUIK CPL W/O STP REUSE FOR 5MM

## (undated) DEVICE — SOLUTION IRRIG 3000ML 0.9% SOD CHL USP UROMATIC PLAS CONT

## (undated) DEVICE — GLOVE SURG SZ 8 L12IN FNGR THK79MIL GRN LTX FREE

## (undated) DEVICE — BANDAGE COMPR W6INXL12FT SMOOTH FOR LIMB EXSANG ESMARCH

## (undated) DEVICE — SHEET,DRAPE,53X77,STERILE: Brand: MEDLINE

## (undated) DEVICE — TRAP,MUCUS SPECIMEN,40CC: Brand: MEDLINE

## (undated) DEVICE — SPLINT KNEE UNIV FOR LESS THAN 36IN L24IN FOAM LAM E CNTCT